# Patient Record
Sex: MALE | Race: WHITE | NOT HISPANIC OR LATINO | Employment: OTHER | URBAN - METROPOLITAN AREA
[De-identification: names, ages, dates, MRNs, and addresses within clinical notes are randomized per-mention and may not be internally consistent; named-entity substitution may affect disease eponyms.]

---

## 2018-04-10 ENCOUNTER — RECORDS - HEALTHEAST (OUTPATIENT)
Dept: LAB | Facility: CLINIC | Age: 68
End: 2018-04-10

## 2018-04-10 LAB
ALBUMIN FLD-MCNC: 0.8 G/DL
GLUCOSE FLD-MCNC: 120 MG/DL
LDH FLD L TO P-CCNC: 71 U/L
PROT FLD-MCNC: 1.7 G/DL

## 2024-09-18 ENCOUNTER — REFERRAL (OUTPATIENT)
Dept: TRANSPLANT | Facility: CLINIC | Age: 74
End: 2024-09-18

## 2024-09-18 DIAGNOSIS — K76.6 PORTAL HYPERTENSION (H): ICD-10-CM

## 2024-09-18 DIAGNOSIS — K70.31 ALCOHOLIC CIRRHOSIS OF LIVER WITH ASCITES (H): ICD-10-CM

## 2024-09-18 DIAGNOSIS — K40.90 HERNIA, INGUINAL: Primary | ICD-10-CM

## 2024-09-18 NOTE — TELEPHONE ENCOUNTER
From [facility] - saw Dr. Balderas up there 9/11/24    Patient is being referred down to see Dr. Dominguez for possible hernia repair.     Low MELD   Cirrhotic patient - s/p TIPS    See CE    September 23, 2024 3:54 PM - Jose Rafael Solis Jr., RN:   Orders placed and message sent to clinic scheduling and coordinators to set up

## 2024-09-23 NOTE — TELEPHONE ENCOUNTER
September 23, 2024 4:27 PM - Jose Rafael Solis Jr. RN: '  Keeping open until scheduling confirmed.

## 2024-10-28 PROBLEM — K70.30 ALCOHOLIC CIRRHOSIS OF LIVER WITHOUT ASCITES (H): Status: ACTIVE | Noted: 2024-10-28

## 2024-10-28 PROBLEM — I26.99 RECURRENT PULMONARY EMBOLI (H): Status: ACTIVE | Noted: 2017-10-30

## 2024-10-28 PROBLEM — J84.9 ILD (INTERSTITIAL LUNG DISEASE) (H): Status: ACTIVE | Noted: 2017-10-11

## 2024-10-28 PROBLEM — Z95.828 S/P TIPS (TRANSJUGULAR INTRAHEPATIC PORTOSYSTEMIC SHUNT): Status: ACTIVE | Noted: 2023-12-22

## 2024-10-28 PROBLEM — K40.90 HERNIA, INGUINAL: Status: ACTIVE | Noted: 2024-10-28

## 2024-10-28 PROBLEM — K70.31 ALCOHOLIC CIRRHOSIS OF LIVER WITH ASCITES (H): Status: ACTIVE | Noted: 2018-04-03

## 2024-10-28 PROBLEM — K82.9 GALL BLADDER DISEASE: Status: ACTIVE | Noted: 2023-06-14

## 2024-10-28 PROBLEM — K76.6 PORTAL HYPERTENSION (H): Status: ACTIVE | Noted: 2018-04-03

## 2024-10-28 PROBLEM — R79.89 ELEVATED TSH: Status: ACTIVE | Noted: 2022-12-09

## 2024-10-28 PROBLEM — K70.31 ALCOHOLIC CIRRHOSIS OF LIVER WITH ASCITES (H): Status: RESOLVED | Noted: 2018-04-03 | Resolved: 2024-10-28

## 2024-11-18 ENCOUNTER — ANESTHESIA EVENT (OUTPATIENT)
Dept: SURGERY | Facility: CLINIC | Age: 74
End: 2024-11-18

## 2024-11-18 ENCOUNTER — OFFICE VISIT (OUTPATIENT)
Dept: TRANSPLANT | Facility: CLINIC | Age: 74
End: 2024-11-18
Attending: TRANSPLANT SURGERY
Payer: COMMERCIAL

## 2024-11-18 ENCOUNTER — APPOINTMENT (OUTPATIENT)
Dept: LAB | Facility: CLINIC | Age: 74
End: 2024-11-18
Payer: COMMERCIAL

## 2024-11-18 ENCOUNTER — OFFICE VISIT (OUTPATIENT)
Dept: SURGERY | Facility: CLINIC | Age: 74
End: 2024-11-18
Attending: TRANSPLANT SURGERY
Payer: COMMERCIAL

## 2024-11-18 VITALS
HEART RATE: 79 BPM | BODY MASS INDEX: 21.82 KG/M2 | OXYGEN SATURATION: 98 % | WEIGHT: 139 LBS | HEIGHT: 67 IN | SYSTOLIC BLOOD PRESSURE: 113 MMHG | DIASTOLIC BLOOD PRESSURE: 76 MMHG | TEMPERATURE: 97.7 F

## 2024-11-18 VITALS
HEART RATE: 91 BPM | DIASTOLIC BLOOD PRESSURE: 64 MMHG | SYSTOLIC BLOOD PRESSURE: 104 MMHG | OXYGEN SATURATION: 94 % | WEIGHT: 139.2 LBS

## 2024-11-18 DIAGNOSIS — R94.5 ABNORMAL RESULTS OF LIVER FUNCTION STUDIES: ICD-10-CM

## 2024-11-18 DIAGNOSIS — K40.90 LEFT INGUINAL HERNIA: ICD-10-CM

## 2024-11-18 DIAGNOSIS — K70.31 ALCOHOLIC CIRRHOSIS OF LIVER WITH ASCITES (H): ICD-10-CM

## 2024-11-18 DIAGNOSIS — K40.90 HERNIA, INGUINAL: ICD-10-CM

## 2024-11-18 DIAGNOSIS — K40.90 LEFT INGUINAL HERNIA: Primary | ICD-10-CM

## 2024-11-18 DIAGNOSIS — K76.6 PORTAL HYPERTENSION (H): ICD-10-CM

## 2024-11-18 LAB
ALBUMIN SERPL BCG-MCNC: 3.3 G/DL (ref 3.5–5.2)
ALP SERPL-CCNC: 106 U/L (ref 40–150)
ALT SERPL W P-5'-P-CCNC: 10 U/L (ref 0–70)
ANION GAP SERPL CALCULATED.3IONS-SCNC: 8 MMOL/L (ref 7–15)
AST SERPL W P-5'-P-CCNC: 24 U/L (ref 0–45)
BASOPHILS # BLD AUTO: 0 10E3/UL (ref 0–0.2)
BASOPHILS NFR BLD AUTO: 0 %
BILIRUB DIRECT SERPL-MCNC: 0.21 MG/DL (ref 0–0.3)
BILIRUB SERPL-MCNC: 0.7 MG/DL
BUN SERPL-MCNC: 21.4 MG/DL (ref 8–23)
CALCIUM SERPL-MCNC: 9.2 MG/DL (ref 8.8–10.4)
CHLORIDE SERPL-SCNC: 106 MMOL/L (ref 98–107)
CREAT SERPL-MCNC: 0.97 MG/DL (ref 0.67–1.17)
EGFRCR SERPLBLD CKD-EPI 2021: 83 ML/MIN/1.73M2
EOSINOPHIL # BLD AUTO: 0.1 10E3/UL (ref 0–0.7)
EOSINOPHIL NFR BLD AUTO: 2 %
ERYTHROCYTE [DISTWIDTH] IN BLOOD BY AUTOMATED COUNT: 15.9 % (ref 10–15)
GLUCOSE SERPL-MCNC: 117 MG/DL (ref 70–99)
HCO3 SERPL-SCNC: 25 MMOL/L (ref 22–29)
HCT VFR BLD AUTO: 34.6 % (ref 40–53)
HGB BLD-MCNC: 10.6 G/DL (ref 13.3–17.7)
IMM GRANULOCYTES # BLD: 0 10E3/UL
IMM GRANULOCYTES NFR BLD: 0 %
INR PPP: 1.65 (ref 0.85–1.15)
LYMPHOCYTES # BLD AUTO: 0.7 10E3/UL (ref 0.8–5.3)
LYMPHOCYTES NFR BLD AUTO: 9 %
MCH RBC QN AUTO: 25.2 PG (ref 26.5–33)
MCHC RBC AUTO-ENTMCNC: 30.6 G/DL (ref 31.5–36.5)
MCV RBC AUTO: 82 FL (ref 78–100)
MONOCYTES # BLD AUTO: 0.6 10E3/UL (ref 0–1.3)
MONOCYTES NFR BLD AUTO: 8 %
NEUTROPHILS # BLD AUTO: 5.8 10E3/UL (ref 1.6–8.3)
NEUTROPHILS NFR BLD AUTO: 81 %
NRBC # BLD AUTO: 0 10E3/UL
NRBC BLD AUTO-RTO: 0 /100
PLATELET # BLD AUTO: 150 10E3/UL (ref 150–450)
POTASSIUM SERPL-SCNC: 4.9 MMOL/L (ref 3.4–5.3)
PROT SERPL-MCNC: 8 G/DL (ref 6.4–8.3)
RBC # BLD AUTO: 4.2 10E6/UL (ref 4.4–5.9)
SODIUM SERPL-SCNC: 139 MMOL/L (ref 135–145)
WBC # BLD AUTO: 7.2 10E3/UL (ref 4–11)

## 2024-11-18 PROCEDURE — 85610 PROTHROMBIN TIME: CPT | Performed by: PATHOLOGY

## 2024-11-18 PROCEDURE — G0463 HOSPITAL OUTPT CLINIC VISIT: HCPCS | Performed by: TRANSPLANT SURGERY

## 2024-11-18 PROCEDURE — 85025 COMPLETE CBC W/AUTO DIFF WBC: CPT | Performed by: PATHOLOGY

## 2024-11-18 PROCEDURE — 80053 COMPREHEN METABOLIC PANEL: CPT | Performed by: PATHOLOGY

## 2024-11-18 PROCEDURE — 99205 OFFICE O/P NEW HI 60 MIN: CPT | Performed by: PHYSICIAN ASSISTANT

## 2024-11-18 PROCEDURE — 99204 OFFICE O/P NEW MOD 45 MIN: CPT | Performed by: TRANSPLANT SURGERY

## 2024-11-18 PROCEDURE — 82248 BILIRUBIN DIRECT: CPT | Performed by: PATHOLOGY

## 2024-11-18 PROCEDURE — 36415 COLL VENOUS BLD VENIPUNCTURE: CPT | Performed by: PATHOLOGY

## 2024-11-18 RX ORDER — ACETAMINOPHEN 500 MG
500 TABLET ORAL
COMMUNITY
Start: 2023-03-31

## 2024-11-18 RX ORDER — WARFARIN SODIUM 1 MG/1
TABLET ORAL EVERY EVENING
COMMUNITY
Start: 2024-11-18

## 2024-11-18 RX ORDER — POTASSIUM CHLORIDE 1500 MG/1
20 TABLET, EXTENDED RELEASE ORAL PRN
COMMUNITY
Start: 2024-08-26

## 2024-11-18 RX ORDER — LACTULOSE 10 G/15ML
15 SOLUTION ORAL DAILY
COMMUNITY
Start: 2024-05-14

## 2024-11-18 RX ORDER — AMILORIDE HYDROCHLORIDE 5 MG/1
10 TABLET ORAL EVERY MORNING
COMMUNITY
Start: 2024-05-14

## 2024-11-18 RX ORDER — FUROSEMIDE 20 MG/1
60 TABLET ORAL EVERY MORNING
COMMUNITY
Start: 2024-05-14

## 2024-11-18 RX ORDER — ALLOPURINOL 300 MG/1
300 TABLET ORAL
COMMUNITY
Start: 2024-05-10

## 2024-11-18 ASSESSMENT — LIFESTYLE VARIABLES: TOBACCO_USE: 0

## 2024-11-18 ASSESSMENT — PAIN SCALES - GENERAL: PAINLEVEL_OUTOF10: NO PAIN (0)

## 2024-11-18 NOTE — H&P
Pre-Operative H & P     CC:  Preoperative exam to assess for increased cardiopulmonary risk while undergoing surgery and anesthesia.    Date of Encounter: 11/18/2024  Primary Care Physician:  No Ref-Primary, Physician     Reason for visit:   Encounter Diagnoses   Name Primary?    Left inguinal hernia     Alcoholic cirrhosis of liver with ascites (H)     Abnormal results of liver function studies        JOSELYN Morel is a 74 year old male who presents for pre-operative H & P in preparation for  Procedure Information       Date/Time: TBD     Procedure: Inguinal hernia repair    Anesthesia type: General    Pre-op diagnosis: Left inguinal hernia    Location: [facility]    Providers: Russ Dominguez MD             Patient is being evaluated for comorbid conditions of interstitial lung disease, recurrent PE on warfarin, alcoholic cirrhosis, portal hypertension s/p TIPS, gout.    He was evaluated earlier today by Dr. Dominguez for a symptomatic left inguinal hernia. The hernia has been present for several years but recently has increased in size and become more painful. He reports that he also has a hernia on the right side, but this is less bothersome. The patient reports that he is anticipating to have surgery before the end of the year.     History is obtained from the patient and chart review. He is accompanied by his wife today.     Hx of abnormal bleeding or anti-platelet use: Warfarin      Past Medical History  Past Medical History:   Diagnosis Date    Alcoholic cirrhosis of liver without ascites (H) 10/28/2024    Gout     Hernia, inguinal 10/28/2024    ILD (interstitial lung disease) (H)     Portal hypertension (H)     Recurrent pulmonary embolism (H)     S/P TIPS (transjugular intrahepatic portosystemic shunt)        Past Surgical History  Past Surgical History:   Procedure Laterality Date    CHOLECYSTECTOMY, OPEN      HERNIA REPAIR      age 7     HERNIA REPAIR      recurrent ventral hernia    TIPS PROCEDURE      x3, last 5/2024       Prior to Admission Medications  Current Outpatient Medications   Medication Sig Dispense Refill    acetaminophen (TYLENOL) 500 MG tablet Take 500 mg by mouth.      allopurinol (ZYLOPRIM) 300 MG tablet Take 300 mg by mouth twice a week.      aMILoride (MIDAMOR) 5 MG tablet Take 10 mg by mouth every morning.      furosemide (LASIX) 20 MG tablet Take 60 mg by mouth every morning.      lactulose (CHRONULAC) 10 GM/15ML solution Take 15 mLs by mouth daily.      potassium chloride lópez ER (KLOR-CON M20) 20 MEQ CR tablet Take 20 mEq by mouth as needed.      rifaximin (XIFAXAN) 550 MG TABS tablet Take 550 mg by mouth 2 times daily.      warfarin ANTICOAGULANT (COUMADIN) 1 MG tablet Take by mouth every evening.         Allergies  Allergies   Allergen Reactions    Food Hives     tangerines    Other Environmental Allergy Other (See Comments) and Itching     pollens       Social History  Social History     Socioeconomic History    Marital status:      Spouse name: Not on file    Number of children: Not on file    Years of education: Not on file    Highest education level: Not on file   Occupational History    Not on file   Tobacco Use    Smoking status: Never     Passive exposure: Never    Smokeless tobacco: Never   Substance and Sexual Activity    Alcohol use: Not Currently     Comment: Sober March 2023    Drug use: Never    Sexual activity: Not on file   Other Topics Concern    Not on file   Social History Narrative    Not on file     Social Drivers of Health     Financial Resource Strain: Low Risk  (4/28/2023)    Received from [facility]    Overall Financial Resource Strain (CARDIA)     Difficulty of Paying Living Expenses: Not hard at all   Food Insecurity: No Food Insecurity (9/29/2024)    Received from [facility]    Hunger Vital Sign     Worried About  Running Out of Food in the Last Year: Never true     Ran Out of Food in the Last Year: Never true   Transportation Needs: No Transportation Needs (9/29/2024)    [facility]    PRAPARE - Transportation     Lack of Transportation (Medical): No     Lack of Transportation (Non-Medical): No   Physical Activity: Inactive (4/29/2024)    Received from [facility]    Exercise Vital Sign     Days of Exercise per Week: 0 days     Minutes of Exercise per Session: 0 min   Stress: No Stress Concern Present (4/29/2024)    Received from [facility]    [employer] - Occupational Stress Questionnaire     Feeling of Stress : Not at all   Social Connections: Moderately Isolated (4/29/2024)    Received from [facility]    Social Connection and Isolation Panel [NHANES]     Frequency of Communication with Friends and Family: Twice a week     Frequency of Social Gatherings with Friends and Family: Once a week     Attends Sabianist Services: Never     Active Member of Clubs or Organizations: No     Attends Club or Organization Meetings: Never     Marital Status:    Interpersonal Safety: Not At Risk (10/25/2024)    Received from [facility] IP Custom IPV     Do you feel UNSAFE in any of your personal relationships with your family members or any other acquaintances?: No   Housing Stability: Low Risk  (9/29/2024)    [facility]    Housing Stability Vital Sign     Unable to Pay for Housing in the Last Year: No     Number of Times Moved in the Last Year: 0     Homeless in the Last Year: No       Family History  Family History   Problem Relation Age of Onset    Anesthesia Reaction No family hx of     Venous thrombosis No family hx of        Review of Systems  The  complete review of systems is negative other than noted in the HPI or here.   Anesthesia Evaluation   Pt has had prior anesthetic.     No history of anesthetic complications       ROS/MED HX  ENT/Pulmonary: Comment: Interstitial lung disease   (-) tobacco use, asthma and ONEYDA risk factors   Neurologic:  - neg neurologic ROS     Cardiovascular:     (+)  - -   -  - -                           valvular problems/murmurs  mild mitral and tricuspid regurgitation.    Previous cardiac testing   Echo: Date: 10/19/23 Results:  Interpretation Summary   Normal LV size and function, EF 60%   Normal RV size and function   Mild left atrial dilatation.   Valves: Aortic valve not well seen, grossly normal function, and mild mitral and trace tricuspid regurgitation.   Normal IVC size and right atrial pressure. Unable to assess RV systolic pressure.     No significant change from prior study.     Stress Test:  Date: Results:    ECG Reviewed:  Date: 11/18/24 Results:  Sinus rhythm   Low voltage QRS     Cath:  Date: Results:      METS/Exercise Tolerance: >4 METS Comment: Activity is limited due to hernias. Able to walk >2 blocks and up 2 flights of stairs without exertional symptoms.    Hematologic:     (+) History of blood clots (recurrent PE),    pt is anticoagulated, anemia,       (-) history of blood transfusion   Musculoskeletal: Comment: Gout    Low back pain      GI/Hepatic: Comment: S/p lap converted to open cholecystectomy 10/1/24    alcoholic cirrhosis, portal hypertension, s/p TIPS and TIPS revision 5/24/24    History of ventral hernia repair    (+)             liver disease,    (-) GERD   Renal/Genitourinary:  - neg Renal ROS     Endo:  - neg endo ROS     Psychiatric/Substance Use:     (+)   alcohol abuse (sober since may 2023)      Infectious Disease:  - neg infectious disease ROS     Malignancy:  - neg malignancy ROS     Other:            /76 (BP Location: Right arm, Patient Position: Sitting, Cuff Size: Adult  "Regular)   Pulse 79   Temp 97.7  F (36.5  C) (Oral)   Ht 1.702 m (5' 7\")   Wt 63 kg (139 lb)   SpO2 98%   BMI 21.77 kg/m      Physical Exam   Constitutional: Pleasant, well-appearing, no apparent distress, and appears stated age.  Eyes: Pupils equal, round and reactive to light, extra ocular muscles intact, sclera clear, conjunctiva normal.  HENT: Normocephalic and atraumatic, oral pharynx with moist mucus membranes, poor dentition. No goiter appreciated.   Respiratory: Clear to auscultation bilaterally, no crackles or wheezing.  Cardiovascular: Regular rate and rhythm, normal S1 and S2, + murmur.  Carotids +2, no bruits. No edema. Palpable pulses to radial  DP and PT arteries.   GI: Non-distended abdomen  Lymph/Hematologic: No cervical lymphadenopathy and no supraclavicular lymphadenopathy.  Genitourinary:  Deferred  Skin: Warm and dry.  No rashes on exposed skin   Musculoskeletal: Full ROM of neck. There is no redness, warmth, or swelling of the visible joints. Gross motor strength is normal.    Neurologic: Awake, alert, oriented to name, place and time. Cranial nerves II-XII are grossly intact. Gait is normal.   Neuropsychiatric: Calm, cooperative. Normal affect.       Prior Labs/Diagnostic Studies   All labs and imaging personally reviewed     EKG/ stress test - if available please see in ROS above   No results found.    CT Angio Chest 9/28/24  IMPRESSION:   1.  No evidence of pulmonary embolism within the main or lobar pulmonary arteries.  Motion limits evaluation of the distal pulmonary arteries.   2.  No acute intrathoracic findings.  No evidence of pneumonia.   3.  Mild basilar pulmonary fibrosis, right-greater-than-left.   4.  Coronary artery atherosclerosis.   5.  Small hiatal hernia.       The patient's records and results personally reviewed by this provider.     Outside records reviewed from: Care Everywhere    LAB/DIAGNOSTIC STUDIES TODAY:  Per transplant team    Assessment  Les Morel is a " 74 year old male seen as a PAC referral for risk assessment and optimization for anesthesia.    Plan/Recommendations  Pt will be optimized for the proposed procedure.  See below for details on the assessment, risk, and preoperative recommendations    NEUROLOGY  - No history of TIA, CVA or seizure    -Post Op delirium risk factors:  Age    HEENT  - No current airway concerns.  Will need to be reassessed day of surgery.  Mallampati: I  TM: > 3  Poor dentition. Patient reports he has an upcoming visit with his dentist.     CARDIAC  - No history of CAD, Hypertension, and Afib  - Heart murmur  Faint murmur appreciated on exam today and patient reports he has been told of this in the past. Echo completed ~1 year ago with mild mitral and tricuspid regurgitation. Patient is asymptomatic at this time.  - Patient denies any other cardiac history or cardiac symptoms.    - METS (Metabolic Equivalents)  Patient performs 4 or more METS exercise without symptoms             Total Score: 0      RCRI-Very low risk: Class 1 0.4% complication rate             Total Score: 0        PULMONARY    ONEYDA Low Risk             Total Score: 2    ONEYDA: Over 50 ys old    ONEYDA: Male      - Denies asthma or inhaler use  - Interstitial lung disease  Mild interstitial lung disease noted on chart review. Stable on CT imaging in 2017 and 2024.  Lungs CTA on exam today. O2 saturations 98% on room air.  - Tobacco History    History   Smoking Status    Never   Smokeless Tobacco    Never       GI  - Alcoholic cirrhosis s/p TIPS x 3  Last TIPS 5/2024  Last paracentesis 2/2024  No alcohol use since May 2023  LFTs today within normal limits  Hold diuretics day of surgery  - History of ventral hernia repair  - Bilateral inguinal hernias (L>R)  Planning for future hernia repair as above    PONV Medium Risk  Total Score: 2           1 AN PONV: Patient is not a current smoker    1 AN PONV: Intended Post Op Opioids        /RENAL  - Baseline Creatinine   "0.97    ENDOCRINE    - BMI: Estimated body mass index is 21.77 kg/m  as calculated from the following:    Height as of this encounter: 1.702 m (5' 7\").    Weight as of this encounter: 63 kg (139 lb).  Healthy Weight (BMI 18.5-24.9)  - No history of Diabetes Mellitus    HEME  VTE Medium Risk 1.8%             Total Score: 6    VTE: Greater than 59 yrs old    VTE: Male    VTE: Pt history of VTE      - Coagulopathy second to Warfarin (Coumadin, Jantoven)  Patient will work with anticoagulation clinic for hold/bridging plan once surgery has been scheduled  - Chronic anemia  Baseline Hgb: 9-10.6  Recommend perioperative use of blood conservation techniques intraoperatively and close monitoring for postoperative bleeding.      MSK  Patient is NOT Frail             Total Score: 2    Frailty: Weight loss 10 lbs or greater    Frailty: Overall lack of energy      - Low back pain  - History of gout    Different anesthesia methods/types have been discussed with the patient, but they are aware that the final plan will be decided by the assigned anesthesia provider on the date of service.    The patient is optimized for their procedure. AVS with information on surgery time/arrival time, meds and NPO status given by nursing staff. No further diagnostic testing indicated.      On the day of service:     Prep time: 25 minutes  Visit time: 30 minutes  Documentation time: 10 minutes  ------------------------------------------  Total time: 65 minutes      Gita Ojeda PA-C  Preoperative Assessment Center  [facility]  Clinic and Surgery Center  Phone: [phone]  Fax: [phone]    "

## 2024-11-18 NOTE — PATIENT INSTRUCTIONS
Preparing for Your Surgery      Name:  Les Morel   MRN:  [id number]   :  1950   Today's Date:  2024       Arriving for surgery:  Surgery date:  24 (Tentative date)  Arrival time:  To be determined (TBD)    Please come to: (subject to change)     Please come to:       [facility]    [address] [place]  [address]     The [facility] [place] Patient/Visitor Ramp is at [address]. Patients and visitors who self-park will receive the reduced hospital parking rate. If the Patient /Visitor Ramp is full, please follow the signs to the [employer] car park located at the main hospital entrance.       parking is available (24 hours/ 7 days a week)      Discounted parking pass options are available for patients and visitors. They can be purchased at the [employer] desk at the main hospital entrance.     -    Stop at the security desk and they will direct surgery patients to the [facility] [phone]        - If you need directions, a wheelchair or an escort please stop at the Information/security desk in the lobby.     What can I eat or drink?  -  You may eat and drink normally up to 8 hours prior to arrival time. (Until TBD)  -  You may have clear liquids until 2 hours prior to arrival time. (Until TBD)    Examples of clear liquids:  Water  Clear broth  Juices (apple, white grape, white cranberry  and cider) without pulp  Noncarbonated, powder based beverages  (lemonade and Yves-Aid)  Sodas (Sprite, 7-Up, ginger ale and seltzer)  Coffee or tea (without milk or cream)  Gatorade    -  No Alcohol or cannabis products for at least 24 hours before surgery.     Which medicines can I take?    Hold Aspirin for 7 days before surgery.   Hold Multivitamins for 7 days before surgery.  Hold Supplements for 7 days before surgery.  Hold Ibuprofen (Advil, Motrin) for  1 day(s) before surgery--unless otherwise directed by surgeon.  Hold Naproxen (Aleve) for 4 days before surgery.    In regards to Warfarin (Coumadin), we defer to your anticoagulation clinic for final recommendations.    -  DO NOT take these medications the day of surgery:  Furosemide (Lasix)  Lactulose  Potassium       -  PLEASE TAKE these medications the day of surgery:  Tylenol as needed  Allopurinol (Zyloprim)  Rifaximin (Xifaxan)    How do I prepare myself?  - Please take 2 showers (one the night prior to surgery and one the morning of surgery) using Scrubcare or Hibiclens soap.    Use this soap only from the neck to your toes. Avoid genital area      Leave the soap on your skin for one minute--then rinse thoroughly.      You may use your own shampoo and conditioner. No other hair products.   - Please remove all jewelry and body piercings.  - No lotions, deodorants or fragrance.  - No makeup or fingernail polish.   - Bring your ID and insurance card.    -If you use a CPAP machine, please bring the CPAP machine, tubing, and mask to hospital.    -If you have a Deep Brain Stimulator, Spinal Cord Stimulator, or any Neuro Stimulator device---you must bring the remote control to the hospital.      ALL PATIENTS GOING HOME THE SAME DAY OF SURGERY ARE REQUIRED TO HAVE A RESPONSIBLE ADULT TO DRIVE AND BE IN ATTENDANCE WITH THEM FOR 24 HOURS FOLLOWING SURGERY.    Covid testing policy as of 12/06/2022  Your surgeon will notify and schedule you for a COVID test if one is needed before surgery--please direct any questions or COVID symptoms to your surgeon      Questions or Concerns:    - For any questions regarding the day of surgery or your hospital stay, please contact the Pre Admission Nursing Office at [phone].       - If you have health changes between today and your surgery, please call your surgeon.       - For questions after surgery, please call your surgeons office.           Current Visitor Guidelines    You  may have 2 visitors in the pre op area.    Visiting hours: 8 a.m. to 8:30 p.m.    Patients confirmed or suspected to have symptoms of COVID 19 or flu:     No visitors allowed for adult patients.   Children (under age 18) can have 1 named visitor.     People who are sick or showing symptoms of COVID 19 or flu:    Are not allowed to visit patients--we can only make exceptions in special situations.       Please follow these guidelines for your visit:          Please maintain social distance          Masking is optional--however at times you may be asked to wear a mask for the safety of yourself and others     Clean your hands with alcohol hand . Do this when you arrive at and leave the building and patient room,    And again after you touch your mask or anything in the room.     Go directly to and from the room you are visiting.     Stay in the patient s room during your visit. Limit going to other places in the hospital as much as possible     Leave bags and jackets at home or in the car.     For everyone s health, please don t come and go during your visit. That includes for smoking   during your visit.

## 2024-11-18 NOTE — LETTER
11/18/2024      Les Morel  [address] [address] [facility] [address]      Dear Colleague,    Thank you for referring your patient, Les Morel, to the [facility]. Please see a copy of my visit note below.    Patient referred by Dr. Fracisco Rodriguez MD    Patient has been diagnosed with Laënnec cirrhosis.  He has decompensated in the form of ascites and he has received TIPS followed by elevation.  He recently had a cholecystectomy done which was a prolonged hospital stay.  He is now presenting with a symptomatic left inguinal hernia.  There is history of pain as well as reducibility.  He is also on anticoagulation     Past Medical History:   Diagnosis Date     Alcoholic cirrhosis of liver without ascites (H) 10/28/2024     Gout      Hernia, inguinal 10/28/2024     ILD (interstitial lung disease) (H)      Portal hypertension (H)      Recurrent pulmonary embolism (H)      S/P TIPS (transjugular intrahepatic portosystemic shunt)      Past Surgical History:   Procedure Laterality Date     CHOLECYSTECTOMY, OPEN       HERNIA REPAIR      age 7     HERNIA REPAIR      recurrent ventral hernia     TIPS PROCEDURE      x3, last 5/2024     Past Surgical History:   Procedure Laterality Date     CHOLECYSTECTOMY, OPEN       HERNIA REPAIR      age 7     HERNIA REPAIR      recurrent ventral hernia     TIPS PROCEDURE      x3, last 5/2024       Patient Active Problem List   Diagnosis     Cardiac dysrhythmia     Elevated TSH     Gall bladder disease     ILD (interstitial lung disease) (H)     Portal hypertension (H)     Recurrent pulmonary emboli (H)     S/P TIPS (transjugular intrahepatic portosystemic shunt)     Hernia, inguinal     Alcoholic cirrhosis of liver without ascites (H)       Current Outpatient Medications   Medication Sig Dispense Refill     acetaminophen (TYLENOL) 500 MG tablet Take 500 mg by mouth.       allopurinol (ZYLOPRIM) 300 MG tablet Take 300 mg by mouth twice a week.       furosemide (LASIX)  20 MG tablet Take 60 mg by mouth every morning.       lactulose (CHRONULAC) 10 GM/15ML solution Take 15 mLs by mouth daily.       potassium chloride lópez ER (KLOR-CON M20) 20 MEQ CR tablet Take 20 mEq by mouth as needed.       rifaximin (XIFAXAN) 550 MG TABS tablet Take 550 mg by mouth 2 times daily.       warfarin ANTICOAGULANT (COUMADIN) 1 MG tablet Take by mouth every evening.       aMILoride (MIDAMOR) 5 MG tablet Take 10 mg by mouth every morning.            Allergies   Allergen Reactions     Food Hives     tangerines     Other Environmental Allergy Other (See Comments) and Itching     pollens       Vitals:    11/18/24 0933   BP: 104/64   Pulse: 91   SpO2: 94%   Weight: 63.1 kg (139 lb 3.2 oz)     Examination of the left groin there is a reducible inguinal hernia indirect.  There is also a previous hernia scar site on the right side.    Clinical impression:    Left indirect inguinal hernia  Laënnec cirrhosis  MELD 3.0: 11 at 11/18/2024 10:31 AM  MELD-Na: 12 at 11/18/2024 10:31 AM  Calculated from:  Serum Creatinine: 0.97 mg/dL (Using min of 1 mg/dL) at 11/18/2024 10:31 AM  Serum Sodium: 139 mmol/L (Using max of 137 mmol/L) at 11/18/2024 10:31 AM  Total Bilirubin: 0.7 mg/dL (Using min of 1 mg/dL) at 11/18/2024 10:31 AM  Serum Albumin: 3.3 g/dL at 11/18/2024 10:31 AM  INR(ratio): 1.65 at 11/18/2024 10:31 AM  Age at listing (hypothetical): 74 years  Sex: Male at 11/18/2024 10:31 AM    Recommendations:  Mesh repair of left inguinal hernia  I explained the risk benefits alternatives of mesh repair of the left inguinal hernia which included but were not limited to infection recurrence and decompensation of the liver disease the patient understands the risks and would like to proceed to surgery.  Stop anticoagulation 48 hours before surgery.      Again, thank you for allowing me to participate in the care of your patient.        Sincerely,        Russ Dominguez MD

## 2024-11-20 LAB
ATRIAL RATE - MUSE: 78 BPM
DIASTOLIC BLOOD PRESSURE - MUSE: NORMAL MMHG
INTERPRETATION ECG - MUSE: NORMAL
P AXIS - MUSE: 27 DEGREES
PR INTERVAL - MUSE: 204 MS
QRS DURATION - MUSE: 84 MS
QT - MUSE: 364 MS
QTC - MUSE: 414 MS
R AXIS - MUSE: -29 DEGREES
SYSTOLIC BLOOD PRESSURE - MUSE: NORMAL MMHG
T AXIS - MUSE: 24 DEGREES
VENTRICULAR RATE- MUSE: 78 BPM

## 2024-11-25 ENCOUNTER — PREP FOR PROCEDURE (OUTPATIENT)
Dept: TRANSPLANT | Facility: CLINIC | Age: 74
End: 2024-11-25
Payer: COMMERCIAL

## 2024-11-25 DIAGNOSIS — K40.90 HERNIA, INGUINAL: Primary | ICD-10-CM

## 2024-11-25 NOTE — PROGRESS NOTES
Patient referred by Dr. Fracisco Rodriguez MD    Patient has been diagnosed with Laënnec cirrhosis.  He has decompensated in the form of ascites and he has received TIPS followed by elevation.  He recently had a cholecystectomy done which was a prolonged hospital stay.  He is now presenting with a symptomatic left inguinal hernia.  There is history of pain as well as reducibility.  He is also on anticoagulation     Past Medical History:   Diagnosis Date    Alcoholic cirrhosis of liver without ascites (H) 10/28/2024    Gout     Hernia, inguinal 10/28/2024    ILD (interstitial lung disease) (H)     Portal hypertension (H)     Recurrent pulmonary embolism (H)     S/P TIPS (transjugular intrahepatic portosystemic shunt)      Past Surgical History:   Procedure Laterality Date    CHOLECYSTECTOMY, OPEN      HERNIA REPAIR      age 7    HERNIA REPAIR      recurrent ventral hernia    TIPS PROCEDURE      x3, last 5/2024     Past Surgical History:   Procedure Laterality Date    CHOLECYSTECTOMY, OPEN      HERNIA REPAIR      age 7    HERNIA REPAIR      recurrent ventral hernia    TIPS PROCEDURE      x3, last 5/2024       Patient Active Problem List   Diagnosis    Cardiac dysrhythmia    Elevated TSH    Gall bladder disease    ILD (interstitial lung disease) (H)    Portal hypertension (H)    Recurrent pulmonary emboli (H)    S/P TIPS (transjugular intrahepatic portosystemic shunt)    Hernia, inguinal    Alcoholic cirrhosis of liver without ascites (H)       Current Outpatient Medications   Medication Sig Dispense Refill    acetaminophen (TYLENOL) 500 MG tablet Take 500 mg by mouth.      allopurinol (ZYLOPRIM) 300 MG tablet Take 300 mg by mouth twice a week.      furosemide (LASIX) 20 MG tablet Take 60 mg by mouth every morning.      lactulose (CHRONULAC) 10 GM/15ML solution Take 15 mLs by mouth daily.      potassium chloride lópez ER (KLOR-CON M20) 20 MEQ CR tablet Take 20 mEq by mouth as needed.      rifaximin (XIFAXAN) 550 MG TABS  tablet Take 550 mg by mouth 2 times daily.      warfarin ANTICOAGULANT (COUMADIN) 1 MG tablet Take by mouth every evening.      aMILoride (MIDAMOR) 5 MG tablet Take 10 mg by mouth every morning.            Allergies   Allergen Reactions    Food Hives     tangerines    Other Environmental Allergy Other (See Comments) and Itching     pollens       Vitals:    11/18/24 0933   BP: 104/64   Pulse: 91   SpO2: 94%   Weight: 63.1 kg (139 lb 3.2 oz)     Examination of the left groin there is a reducible inguinal hernia indirect.  There is also a previous hernia scar site on the right side.    Clinical impression:    Left indirect inguinal hernia  Laënnec cirrhosis  MELD 3.0: 11 at 11/18/2024 10:31 AM  MELD-Na: 12 at 11/18/2024 10:31 AM  Calculated from:  Serum Creatinine: 0.97 mg/dL (Using min of 1 mg/dL) at 11/18/2024 10:31 AM  Serum Sodium: 139 mmol/L (Using max of 137 mmol/L) at 11/18/2024 10:31 AM  Total Bilirubin: 0.7 mg/dL (Using min of 1 mg/dL) at 11/18/2024 10:31 AM  Serum Albumin: 3.3 g/dL at 11/18/2024 10:31 AM  INR(ratio): 1.65 at 11/18/2024 10:31 AM  Age at listing (hypothetical): 74 years  Sex: Male at 11/18/2024 10:31 AM    Recommendations:  Mesh repair of left inguinal hernia  I explained the risk benefits alternatives of mesh repair of the left inguinal hernia which included but were not limited to infection recurrence and decompensation of the liver disease the patient understands the risks and would like to proceed to surgery.  Stop anticoagulation 48 hours before surgery.

## 2024-12-09 ENCOUNTER — TELEPHONE (OUTPATIENT)
Dept: SURGERY | Facility: CLINIC | Age: 74
End: 2024-12-09
Payer: COMMERCIAL

## 2024-12-09 NOTE — TELEPHONE ENCOUNTER
Per the anticoagulation nurse at [facility]:  hold coumadin 5 days prior to surgery, bridge with lovenox, 60 mg BID.  Updated Gita Ojeda NP.  Alena Owens RN

## 2024-12-18 ENCOUNTER — ANESTHESIA EVENT (OUTPATIENT)
Dept: SURGERY | Facility: CLINIC | Age: 74
End: 2024-12-18
Payer: COMMERCIAL

## 2024-12-18 RX ORDER — ENOXAPARIN SODIUM 100 MG/ML
60 INJECTION SUBCUTANEOUS 2 TIMES DAILY
Status: ON HOLD | COMMUNITY
End: 2024-12-24

## 2024-12-19 ENCOUNTER — HOSPITAL ENCOUNTER (INPATIENT)
Facility: CLINIC | Age: 74
End: 2024-12-19
Attending: TRANSPLANT SURGERY | Admitting: TRANSPLANT SURGERY
Payer: COMMERCIAL

## 2024-12-19 ENCOUNTER — APPOINTMENT (OUTPATIENT)
Dept: GENERAL RADIOLOGY | Facility: CLINIC | Age: 74
End: 2024-12-19
Attending: STUDENT IN AN ORGANIZED HEALTH CARE EDUCATION/TRAINING PROGRAM
Payer: COMMERCIAL

## 2024-12-19 ENCOUNTER — ANESTHESIA (OUTPATIENT)
Dept: SURGERY | Facility: CLINIC | Age: 74
End: 2024-12-19
Payer: COMMERCIAL

## 2024-12-19 VITALS
TEMPERATURE: 97.7 F | DIASTOLIC BLOOD PRESSURE: 57 MMHG | SYSTOLIC BLOOD PRESSURE: 115 MMHG | BODY MASS INDEX: 20.8 KG/M2 | HEIGHT: 67 IN | HEART RATE: 75 BPM | RESPIRATION RATE: 17 BRPM | WEIGHT: 132.5 LBS | OXYGEN SATURATION: 96 %

## 2024-12-19 DIAGNOSIS — Z87.19 S/P LEFT INGUINAL HERNIA REPAIR: ICD-10-CM

## 2024-12-19 DIAGNOSIS — K70.30 ALCOHOLIC CIRRHOSIS OF LIVER WITHOUT ASCITES (H): Primary | ICD-10-CM

## 2024-12-19 DIAGNOSIS — I26.99 RECURRENT PULMONARY EMBOLI (H): ICD-10-CM

## 2024-12-19 DIAGNOSIS — Z98.890 S/P LEFT INGUINAL HERNIA REPAIR: ICD-10-CM

## 2024-12-19 LAB
ALBUMIN SERPL BCG-MCNC: 3.3 G/DL (ref 3.5–5.2)
ALP SERPL-CCNC: 82 U/L (ref 40–150)
ALT SERPL W P-5'-P-CCNC: <5 U/L (ref 0–70)
ANION GAP SERPL CALCULATED.3IONS-SCNC: 12 MMOL/L (ref 7–15)
ANION GAP SERPL CALCULATED.3IONS-SCNC: 12 MMOL/L (ref 7–15)
AST SERPL W P-5'-P-CCNC: 26 U/L (ref 0–45)
BACTERIA SPEC CULT: NORMAL
BILIRUB SERPL-MCNC: 1.1 MG/DL
BUN SERPL-MCNC: 16.6 MG/DL (ref 8–23)
BUN SERPL-MCNC: 16.6 MG/DL (ref 8–23)
CALCIUM SERPL-MCNC: 8.3 MG/DL (ref 8.8–10.4)
CALCIUM SERPL-MCNC: 8.3 MG/DL (ref 8.8–10.4)
CHLORIDE SERPL-SCNC: 102 MMOL/L (ref 98–107)
CHLORIDE SERPL-SCNC: 102 MMOL/L (ref 98–107)
CREAT SERPL-MCNC: 0.94 MG/DL (ref 0.67–1.17)
CREAT SERPL-MCNC: 0.94 MG/DL (ref 0.67–1.17)
EGFRCR SERPLBLD CKD-EPI 2021: 86 ML/MIN/1.73M2
EGFRCR SERPLBLD CKD-EPI 2021: 86 ML/MIN/1.73M2
ERYTHROCYTE [DISTWIDTH] IN BLOOD BY AUTOMATED COUNT: 16.2 % (ref 10–15)
GLUCOSE SERPL-MCNC: 161 MG/DL (ref 70–99)
GLUCOSE SERPL-MCNC: 161 MG/DL (ref 70–99)
GRAM STAIN RESULT: NORMAL
GRAM STAIN RESULT: NORMAL
HCO3 SERPL-SCNC: 21 MMOL/L (ref 22–29)
HCO3 SERPL-SCNC: 21 MMOL/L (ref 22–29)
HCT VFR BLD AUTO: 31.8 % (ref 40–53)
HGB BLD-MCNC: 9.9 G/DL (ref 13.3–17.7)
INR PPP: 1.28 (ref 0.85–1.15)
MAGNESIUM SERPL-MCNC: 2.1 MG/DL (ref 1.7–2.3)
MCH RBC QN AUTO: 24.6 PG (ref 26.5–33)
MCHC RBC AUTO-ENTMCNC: 31.1 G/DL (ref 31.5–36.5)
MCV RBC AUTO: 79 FL (ref 78–100)
PHOSPHATE SERPL-MCNC: 3.7 MG/DL (ref 2.5–4.5)
PLATELET # BLD AUTO: 157 10E3/UL (ref 150–450)
POTASSIUM SERPL-SCNC: 4.4 MMOL/L (ref 3.4–5.3)
POTASSIUM SERPL-SCNC: 4.4 MMOL/L (ref 3.4–5.3)
PROT SERPL-MCNC: 7.4 G/DL (ref 6.4–8.3)
RBC # BLD AUTO: 4.03 10E6/UL (ref 4.4–5.9)
SODIUM SERPL-SCNC: 135 MMOL/L (ref 135–145)
SODIUM SERPL-SCNC: 135 MMOL/L (ref 135–145)
WBC # BLD AUTO: 9.4 10E3/UL (ref 4–11)

## 2024-12-19 PROCEDURE — 87102 FUNGUS ISOLATION CULTURE: CPT | Performed by: TRANSPLANT SURGERY

## 2024-12-19 PROCEDURE — 360N000075 HC SURGERY LEVEL 2, PER MIN: Performed by: TRANSPLANT SURGERY

## 2024-12-19 PROCEDURE — 250N000011 HC RX IP 250 OP 636: Performed by: ANESTHESIOLOGY

## 2024-12-19 PROCEDURE — 250N000009 HC RX 250: Performed by: REGISTERED NURSE

## 2024-12-19 PROCEDURE — 250N000013 HC RX MED GY IP 250 OP 250 PS 637: Performed by: STUDENT IN AN ORGANIZED HEALTH CARE EDUCATION/TRAINING PROGRAM

## 2024-12-19 PROCEDURE — P9045 ALBUMIN (HUMAN), 5%, 250 ML: HCPCS | Performed by: REGISTERED NURSE

## 2024-12-19 PROCEDURE — 258N000003 HC RX IP 258 OP 636: Performed by: STUDENT IN AN ORGANIZED HEALTH CARE EDUCATION/TRAINING PROGRAM

## 2024-12-19 PROCEDURE — 250N000011 HC RX IP 250 OP 636: Performed by: TRANSPLANT SURGERY

## 2024-12-19 PROCEDURE — 82310 ASSAY OF CALCIUM: CPT | Performed by: STUDENT IN AN ORGANIZED HEALTH CARE EDUCATION/TRAINING PROGRAM

## 2024-12-19 PROCEDURE — 250N000009 HC RX 250

## 2024-12-19 PROCEDURE — 84132 ASSAY OF SERUM POTASSIUM: CPT | Performed by: NURSE PRACTITIONER

## 2024-12-19 PROCEDURE — 258N000003 HC RX IP 258 OP 636: Performed by: REGISTERED NURSE

## 2024-12-19 PROCEDURE — 84100 ASSAY OF PHOSPHORUS: CPT | Performed by: NURSE PRACTITIONER

## 2024-12-19 PROCEDURE — C9290 INJ, BUPIVACAINE LIPOSOME: HCPCS | Performed by: ANESTHESIOLOGY

## 2024-12-19 PROCEDURE — 3E0436Z INTRODUCTION OF NUTRITIONAL SUBSTANCE INTO CENTRAL VEIN, PERCUTANEOUS APPROACH: ICD-10-PCS | Performed by: TRANSPLANT SURGERY

## 2024-12-19 PROCEDURE — 370N000017 HC ANESTHESIA TECHNICAL FEE, PER MIN: Performed by: TRANSPLANT SURGERY

## 2024-12-19 PROCEDURE — 999N000065 XR ABDOMEN PORT 1 VIEW

## 2024-12-19 PROCEDURE — 0D9N0ZX DRAINAGE OF SIGMOID COLON, OPEN APPROACH, DIAGNOSTIC: ICD-10-PCS | Performed by: TRANSPLANT SURGERY

## 2024-12-19 PROCEDURE — 87205 SMEAR GRAM STAIN: CPT | Performed by: TRANSPLANT SURGERY

## 2024-12-19 PROCEDURE — 999N000141 HC STATISTIC PRE-PROCEDURE NURSING ASSESSMENT: Performed by: TRANSPLANT SURGERY

## 2024-12-19 PROCEDURE — 250N000009 HC RX 250: Performed by: TRANSPLANT SURGERY

## 2024-12-19 PROCEDURE — 85610 PROTHROMBIN TIME: CPT | Performed by: NURSE PRACTITIONER

## 2024-12-19 PROCEDURE — 87076 CULTURE ANAEROBE IDENT EACH: CPT | Performed by: TRANSPLANT SURGERY

## 2024-12-19 PROCEDURE — 710N000010 HC RECOVERY PHASE 1, LEVEL 2, PER MIN: Performed by: TRANSPLANT SURGERY

## 2024-12-19 PROCEDURE — 272N000001 HC OR GENERAL SUPPLY STERILE: Performed by: TRANSPLANT SURGERY

## 2024-12-19 PROCEDURE — 0DQN0ZZ REPAIR SIGMOID COLON, OPEN APPROACH: ICD-10-PCS | Performed by: TRANSPLANT SURGERY

## 2024-12-19 PROCEDURE — 88302 TISSUE EXAM BY PATHOLOGIST: CPT | Mod: TC | Performed by: TRANSPLANT SURGERY

## 2024-12-19 PROCEDURE — 87070 CULTURE OTHR SPECIMN AEROBIC: CPT | Performed by: TRANSPLANT SURGERY

## 2024-12-19 PROCEDURE — 36415 COLL VENOUS BLD VENIPUNCTURE: CPT | Performed by: NURSE PRACTITIONER

## 2024-12-19 PROCEDURE — 250N000025 HC SEVOFLURANE, PER MIN: Performed by: TRANSPLANT SURGERY

## 2024-12-19 PROCEDURE — 250N000011 HC RX IP 250 OP 636: Performed by: REGISTERED NURSE

## 2024-12-19 PROCEDURE — 250N000011 HC RX IP 250 OP 636: Performed by: STUDENT IN AN ORGANIZED HEALTH CARE EDUCATION/TRAINING PROGRAM

## 2024-12-19 PROCEDURE — 85027 COMPLETE CBC AUTOMATED: CPT | Performed by: STUDENT IN AN ORGANIZED HEALTH CARE EDUCATION/TRAINING PROGRAM

## 2024-12-19 PROCEDURE — 88302 TISSUE EXAM BY PATHOLOGIST: CPT | Mod: 26 | Performed by: PATHOLOGY

## 2024-12-19 PROCEDURE — 74018 RADEX ABDOMEN 1 VIEW: CPT | Mod: 26 | Performed by: RADIOLOGY

## 2024-12-19 PROCEDURE — 49521 REREPAIR ING HERNIA BLOCKED: CPT | Mod: LT | Performed by: TRANSPLANT SURGERY

## 2024-12-19 PROCEDURE — 120N000011 HC R&B TRANSPLANT UMMC

## 2024-12-19 PROCEDURE — 83735 ASSAY OF MAGNESIUM: CPT | Performed by: NURSE PRACTITIONER

## 2024-12-19 PROCEDURE — 0YQ60ZZ REPAIR LEFT INGUINAL REGION, OPEN APPROACH: ICD-10-PCS | Performed by: TRANSPLANT SURGERY

## 2024-12-19 PROCEDURE — B4185 PARENTERAL SOL 10 GM LIPIDS: HCPCS | Performed by: TRANSPLANT SURGERY

## 2024-12-19 RX ORDER — NALOXONE HYDROCHLORIDE 0.4 MG/ML
0.4 INJECTION, SOLUTION INTRAMUSCULAR; INTRAVENOUS; SUBCUTANEOUS
Status: DISCONTINUED | OUTPATIENT
Start: 2024-12-19 | End: 2024-12-24 | Stop reason: HOSPADM

## 2024-12-19 RX ORDER — ONDANSETRON 2 MG/ML
4 INJECTION INTRAMUSCULAR; INTRAVENOUS EVERY 30 MIN PRN
Status: DISCONTINUED | OUTPATIENT
Start: 2024-12-19 | End: 2024-12-19 | Stop reason: HOSPADM

## 2024-12-19 RX ORDER — ONDANSETRON 2 MG/ML
4 INJECTION INTRAMUSCULAR; INTRAVENOUS EVERY 6 HOURS PRN
Status: DISCONTINUED | OUTPATIENT
Start: 2024-12-19 | End: 2024-12-24 | Stop reason: HOSPADM

## 2024-12-19 RX ORDER — AMOXICILLIN 250 MG
1 CAPSULE ORAL 2 TIMES DAILY
Status: DISCONTINUED | OUTPATIENT
Start: 2024-12-19 | End: 2024-12-24 | Stop reason: HOSPADM

## 2024-12-19 RX ORDER — HYDROMORPHONE HCL IN WATER/PF 6 MG/30 ML
0.2 PATIENT CONTROLLED ANALGESIA SYRINGE INTRAVENOUS EVERY 4 HOURS PRN
Status: DISCONTINUED | OUTPATIENT
Start: 2024-12-19 | End: 2024-12-23

## 2024-12-19 RX ORDER — DEXAMETHASONE SODIUM PHOSPHATE 4 MG/ML
4 INJECTION, SOLUTION INTRA-ARTICULAR; INTRALESIONAL; INTRAMUSCULAR; INTRAVENOUS; SOFT TISSUE
Status: DISCONTINUED | OUTPATIENT
Start: 2024-12-19 | End: 2024-12-19 | Stop reason: HOSPADM

## 2024-12-19 RX ORDER — NALOXONE HYDROCHLORIDE 0.4 MG/ML
0.1 INJECTION, SOLUTION INTRAMUSCULAR; INTRAVENOUS; SUBCUTANEOUS
Status: DISCONTINUED | OUTPATIENT
Start: 2024-12-19 | End: 2024-12-19 | Stop reason: HOSPADM

## 2024-12-19 RX ORDER — BUPIVACAINE HYDROCHLORIDE 2.5 MG/ML
INJECTION, SOLUTION EPIDURAL; INFILTRATION; INTRACAUDAL
Status: COMPLETED | OUTPATIENT
Start: 2024-12-19 | End: 2024-12-19

## 2024-12-19 RX ORDER — ONDANSETRON 2 MG/ML
4 INJECTION INTRAMUSCULAR; INTRAVENOUS EVERY 30 MIN PRN
Status: DISCONTINUED | OUTPATIENT
Start: 2024-12-19 | End: 2024-12-19

## 2024-12-19 RX ORDER — HYDROMORPHONE HCL IN WATER/PF 6 MG/30 ML
0.1 PATIENT CONTROLLED ANALGESIA SYRINGE INTRAVENOUS EVERY 4 HOURS PRN
Status: DISCONTINUED | OUTPATIENT
Start: 2024-12-19 | End: 2024-12-23

## 2024-12-19 RX ORDER — ACETAMINOPHEN 325 MG/1
975 TABLET ORAL EVERY 8 HOURS
Status: DISCONTINUED | OUTPATIENT
Start: 2024-12-19 | End: 2024-12-24 | Stop reason: HOSPADM

## 2024-12-19 RX ORDER — DIPHENHYDRAMINE HCL 12.5MG/5ML
12.5 LIQUID (ML) ORAL EVERY 6 HOURS PRN
Status: DISCONTINUED | OUTPATIENT
Start: 2024-12-19 | End: 2024-12-24 | Stop reason: HOSPADM

## 2024-12-19 RX ORDER — PIPERACILLIN SODIUM, TAZOBACTAM SODIUM 3; .375 G/15ML; G/15ML
3.38 INJECTION, POWDER, LYOPHILIZED, FOR SOLUTION INTRAVENOUS EVERY 6 HOURS
Status: DISCONTINUED | OUTPATIENT
Start: 2024-12-19 | End: 2024-12-19

## 2024-12-19 RX ORDER — SODIUM CHLORIDE, SODIUM LACTATE, POTASSIUM CHLORIDE, CALCIUM CHLORIDE 600; 310; 30; 20 MG/100ML; MG/100ML; MG/100ML; MG/100ML
INJECTION, SOLUTION INTRAVENOUS CONTINUOUS
Status: DISCONTINUED | OUTPATIENT
Start: 2024-12-19 | End: 2024-12-19 | Stop reason: HOSPADM

## 2024-12-19 RX ORDER — FLUMAZENIL 0.1 MG/ML
0.2 INJECTION, SOLUTION INTRAVENOUS
Status: DISCONTINUED | OUTPATIENT
Start: 2024-12-19 | End: 2024-12-19 | Stop reason: HOSPADM

## 2024-12-19 RX ORDER — NALOXONE HYDROCHLORIDE 0.4 MG/ML
0.2 INJECTION, SOLUTION INTRAMUSCULAR; INTRAVENOUS; SUBCUTANEOUS
Status: DISCONTINUED | OUTPATIENT
Start: 2024-12-19 | End: 2024-12-19 | Stop reason: HOSPADM

## 2024-12-19 RX ORDER — LIDOCAINE HYDROCHLORIDE 20 MG/ML
INJECTION, SOLUTION INFILTRATION; PERINEURAL PRN
Status: DISCONTINUED | OUTPATIENT
Start: 2024-12-19 | End: 2024-12-19

## 2024-12-19 RX ORDER — DEXAMETHASONE SODIUM PHOSPHATE 4 MG/ML
4 INJECTION, SOLUTION INTRA-ARTICULAR; INTRALESIONAL; INTRAMUSCULAR; INTRAVENOUS; SOFT TISSUE
Status: DISCONTINUED | OUTPATIENT
Start: 2024-12-19 | End: 2024-12-19

## 2024-12-19 RX ORDER — NALOXONE HYDROCHLORIDE 0.4 MG/ML
0.4 INJECTION, SOLUTION INTRAMUSCULAR; INTRAVENOUS; SUBCUTANEOUS
Status: DISCONTINUED | OUTPATIENT
Start: 2024-12-19 | End: 2024-12-19 | Stop reason: HOSPADM

## 2024-12-19 RX ORDER — NALOXONE HYDROCHLORIDE 0.4 MG/ML
0.2 INJECTION, SOLUTION INTRAMUSCULAR; INTRAVENOUS; SUBCUTANEOUS
Status: DISCONTINUED | OUTPATIENT
Start: 2024-12-19 | End: 2024-12-24 | Stop reason: HOSPADM

## 2024-12-19 RX ORDER — HYDROMORPHONE HYDROCHLORIDE 1 MG/ML
0.4 INJECTION, SOLUTION INTRAMUSCULAR; INTRAVENOUS; SUBCUTANEOUS EVERY 5 MIN PRN
Status: DISCONTINUED | OUTPATIENT
Start: 2024-12-19 | End: 2024-12-19 | Stop reason: HOSPADM

## 2024-12-19 RX ORDER — PROPOFOL 10 MG/ML
INJECTION, EMULSION INTRAVENOUS PRN
Status: DISCONTINUED | OUTPATIENT
Start: 2024-12-19 | End: 2024-12-19

## 2024-12-19 RX ORDER — ONDANSETRON 4 MG/1
4 TABLET, ORALLY DISINTEGRATING ORAL EVERY 30 MIN PRN
Status: DISCONTINUED | OUTPATIENT
Start: 2024-12-19 | End: 2024-12-19

## 2024-12-19 RX ORDER — PIPERACILLIN SODIUM, TAZOBACTAM SODIUM 3; .375 G/15ML; G/15ML
3.38 INJECTION, POWDER, LYOPHILIZED, FOR SOLUTION INTRAVENOUS EVERY 6 HOURS
Status: DISCONTINUED | OUTPATIENT
Start: 2024-12-19 | End: 2024-12-19 | Stop reason: HOSPADM

## 2024-12-19 RX ORDER — FLUCONAZOLE 2 MG/ML
400 INJECTION, SOLUTION INTRAVENOUS EVERY 24 HOURS
Status: DISCONTINUED | OUTPATIENT
Start: 2024-12-20 | End: 2024-12-20

## 2024-12-19 RX ORDER — DEXTROSE MONOHYDRATE 100 MG/ML
INJECTION, SOLUTION INTRAVENOUS CONTINUOUS PRN
Status: DISCONTINUED | OUTPATIENT
Start: 2024-12-19 | End: 2024-12-24 | Stop reason: HOSPADM

## 2024-12-19 RX ORDER — FENTANYL CITRATE 50 UG/ML
25 INJECTION, SOLUTION INTRAMUSCULAR; INTRAVENOUS EVERY 5 MIN PRN
Status: DISCONTINUED | OUTPATIENT
Start: 2024-12-19 | End: 2024-12-19 | Stop reason: HOSPADM

## 2024-12-19 RX ORDER — BISACODYL 10 MG
10 SUPPOSITORY, RECTAL RECTAL DAILY PRN
Status: DISCONTINUED | OUTPATIENT
Start: 2024-12-22 | End: 2024-12-24 | Stop reason: HOSPADM

## 2024-12-19 RX ORDER — OXYCODONE HYDROCHLORIDE 5 MG/1
5 TABLET ORAL
Status: DISCONTINUED | OUTPATIENT
Start: 2024-12-19 | End: 2024-12-19

## 2024-12-19 RX ORDER — DIPHENHYDRAMINE HYDROCHLORIDE 50 MG/ML
12.5 INJECTION INTRAMUSCULAR; INTRAVENOUS EVERY 6 HOURS PRN
Status: DISCONTINUED | OUTPATIENT
Start: 2024-12-19 | End: 2024-12-24 | Stop reason: HOSPADM

## 2024-12-19 RX ORDER — ONDANSETRON 4 MG/1
4 TABLET, ORALLY DISINTEGRATING ORAL EVERY 6 HOURS PRN
Status: DISCONTINUED | OUTPATIENT
Start: 2024-12-19 | End: 2024-12-24 | Stop reason: HOSPADM

## 2024-12-19 RX ORDER — FLUCONAZOLE 2 MG/ML
400 INJECTION, SOLUTION INTRAVENOUS EVERY 24 HOURS
Status: DISCONTINUED | OUTPATIENT
Start: 2024-12-19 | End: 2024-12-19

## 2024-12-19 RX ORDER — HYDROMORPHONE HYDROCHLORIDE 1 MG/ML
0.2 INJECTION, SOLUTION INTRAMUSCULAR; INTRAVENOUS; SUBCUTANEOUS EVERY 5 MIN PRN
Status: DISCONTINUED | OUTPATIENT
Start: 2024-12-19 | End: 2024-12-19 | Stop reason: HOSPADM

## 2024-12-19 RX ORDER — DEXAMETHASONE SODIUM PHOSPHATE 4 MG/ML
INJECTION, SOLUTION INTRA-ARTICULAR; INTRALESIONAL; INTRAMUSCULAR; INTRAVENOUS; SOFT TISSUE PRN
Status: DISCONTINUED | OUTPATIENT
Start: 2024-12-19 | End: 2024-12-19

## 2024-12-19 RX ORDER — LIDOCAINE 40 MG/G
CREAM TOPICAL
Status: DISCONTINUED | OUTPATIENT
Start: 2024-12-19 | End: 2024-12-19 | Stop reason: HOSPADM

## 2024-12-19 RX ORDER — FENTANYL CITRATE 50 UG/ML
INJECTION, SOLUTION INTRAMUSCULAR; INTRAVENOUS PRN
Status: DISCONTINUED | OUTPATIENT
Start: 2024-12-19 | End: 2024-12-19

## 2024-12-19 RX ORDER — LIDOCAINE 40 MG/G
CREAM TOPICAL
Status: DISCONTINUED | OUTPATIENT
Start: 2024-12-19 | End: 2024-12-24 | Stop reason: HOSPADM

## 2024-12-19 RX ORDER — VANCOMYCIN HYDROCHLORIDE
1500 EVERY 24 HOURS
Status: DISCONTINUED | OUTPATIENT
Start: 2024-12-19 | End: 2024-12-19

## 2024-12-19 RX ORDER — POLYETHYLENE GLYCOL 3350 17 G/17G
17 POWDER, FOR SOLUTION ORAL DAILY
Status: DISCONTINUED | OUTPATIENT
Start: 2024-12-20 | End: 2024-12-24 | Stop reason: HOSPADM

## 2024-12-19 RX ORDER — METHOCARBAMOL 500 MG/1
250 TABLET ORAL EVERY 6 HOURS PRN
Status: DISCONTINUED | OUTPATIENT
Start: 2024-12-19 | End: 2024-12-24 | Stop reason: HOSPADM

## 2024-12-19 RX ORDER — OXYCODONE HYDROCHLORIDE 5 MG/1
5 TABLET ORAL EVERY 4 HOURS PRN
Status: DISCONTINUED | OUTPATIENT
Start: 2024-12-19 | End: 2024-12-23

## 2024-12-19 RX ORDER — ONDANSETRON 4 MG/1
4 TABLET, ORALLY DISINTEGRATING ORAL EVERY 30 MIN PRN
Status: DISCONTINUED | OUTPATIENT
Start: 2024-12-19 | End: 2024-12-19 | Stop reason: HOSPADM

## 2024-12-19 RX ORDER — SODIUM CHLORIDE, SODIUM LACTATE, POTASSIUM CHLORIDE, CALCIUM CHLORIDE 600; 310; 30; 20 MG/100ML; MG/100ML; MG/100ML; MG/100ML
INJECTION, SOLUTION INTRAVENOUS CONTINUOUS PRN
Status: DISCONTINUED | OUTPATIENT
Start: 2024-12-19 | End: 2024-12-19

## 2024-12-19 RX ORDER — VANCOMYCIN HYDROCHLORIDE 1 G/200ML
1000 INJECTION, SOLUTION INTRAVENOUS ONCE
Status: COMPLETED | OUTPATIENT
Start: 2024-12-19 | End: 2024-12-19

## 2024-12-19 RX ORDER — FENTANYL CITRATE 50 UG/ML
50 INJECTION, SOLUTION INTRAMUSCULAR; INTRAVENOUS EVERY 5 MIN PRN
Status: DISCONTINUED | OUTPATIENT
Start: 2024-12-19 | End: 2024-12-19 | Stop reason: HOSPADM

## 2024-12-19 RX ORDER — ONDANSETRON 2 MG/ML
INJECTION INTRAMUSCULAR; INTRAVENOUS PRN
Status: DISCONTINUED | OUTPATIENT
Start: 2024-12-19 | End: 2024-12-19

## 2024-12-19 RX ORDER — TAMSULOSIN HYDROCHLORIDE 0.4 MG/1
0.4 CAPSULE ORAL DAILY
Status: DISCONTINUED | OUTPATIENT
Start: 2024-12-19 | End: 2024-12-20

## 2024-12-19 RX ORDER — OXYCODONE HYDROCHLORIDE 10 MG/1
10 TABLET ORAL
Status: DISCONTINUED | OUTPATIENT
Start: 2024-12-19 | End: 2024-12-19

## 2024-12-19 RX ORDER — SODIUM CHLORIDE, SODIUM LACTATE, POTASSIUM CHLORIDE, CALCIUM CHLORIDE 600; 310; 30; 20 MG/100ML; MG/100ML; MG/100ML; MG/100ML
INJECTION, SOLUTION INTRAVENOUS CONTINUOUS
Status: ACTIVE | OUTPATIENT
Start: 2024-12-19 | End: 2024-12-19

## 2024-12-19 RX ORDER — NALOXONE HYDROCHLORIDE 0.4 MG/ML
0.1 INJECTION, SOLUTION INTRAMUSCULAR; INTRAVENOUS; SUBCUTANEOUS
Status: DISCONTINUED | OUTPATIENT
Start: 2024-12-19 | End: 2024-12-19

## 2024-12-19 RX ORDER — PROCHLORPERAZINE MALEATE 5 MG/1
5 TABLET ORAL EVERY 6 HOURS PRN
Status: DISCONTINUED | OUTPATIENT
Start: 2024-12-19 | End: 2024-12-24 | Stop reason: HOSPADM

## 2024-12-19 RX ORDER — FENTANYL CITRATE 50 UG/ML
25-50 INJECTION, SOLUTION INTRAMUSCULAR; INTRAVENOUS
Status: DISCONTINUED | OUTPATIENT
Start: 2024-12-19 | End: 2024-12-19 | Stop reason: HOSPADM

## 2024-12-19 RX ORDER — VANCOMYCIN HYDROCHLORIDE
1500 EVERY 24 HOURS
Status: DISCONTINUED | OUTPATIENT
Start: 2024-12-20 | End: 2024-12-22

## 2024-12-19 RX ORDER — FLUCONAZOLE 2 MG/ML
INJECTION, SOLUTION INTRAVENOUS PRN
Status: DISCONTINUED | OUTPATIENT
Start: 2024-12-19 | End: 2024-12-19

## 2024-12-19 RX ADMIN — PIPERACILLIN AND TAZOBACTAM 3.38 G: 3; .375 INJECTION, POWDER, FOR SOLUTION INTRAVENOUS at 11:57

## 2024-12-19 RX ADMIN — FLUCONAZOLE IN SODIUM CHLORIDE 200 MG: 2 INJECTION, SOLUTION INTRAVENOUS at 13:46

## 2024-12-19 RX ADMIN — LIDOCAINE HYDROCHLORIDE 80 MG: 20 INJECTION, SOLUTION INFILTRATION; PERINEURAL at 11:52

## 2024-12-19 RX ADMIN — FENTANYL CITRATE 50 MCG: 50 INJECTION INTRAMUSCULAR; INTRAVENOUS at 12:32

## 2024-12-19 RX ADMIN — SENNOSIDES AND DOCUSATE SODIUM 1 TABLET: 50; 8.6 TABLET ORAL at 19:40

## 2024-12-19 RX ADMIN — SODIUM CHLORIDE, POTASSIUM CHLORIDE, SODIUM LACTATE AND CALCIUM CHLORIDE: 600; 310; 30; 20 INJECTION, SOLUTION INTRAVENOUS at 11:44

## 2024-12-19 RX ADMIN — ALBUMIN (HUMAN): 12.5 SOLUTION INTRAVENOUS at 13:46

## 2024-12-19 RX ADMIN — ASCORBIC ACID, VITAMIN A PALMITATE, CHOLECALCIFEROL, THIAMINE HYDROCHLORIDE, RIBOFLAVIN-5 PHOSPHATE SODIUM, PYRIDOXINE HYDROCHLORIDE, NIACINAMIDE, DEXPANTHENOL, ALPHA-TOCOPHEROL ACETATE, VITAMIN K1, FOLIC ACID, BIOTIN, CYANOCOBALAMIN: 200; 3300; 200; 6; 3.6; 6; 40; 15; 10; 150; 600; 60; 5 INJECTION, SOLUTION INTRAVENOUS at 21:09

## 2024-12-19 RX ADMIN — Medication 20 MG: at 12:22

## 2024-12-19 RX ADMIN — BUPIVACAINE HYDROCHLORIDE 10 ML: 2.5 INJECTION, SOLUTION EPIDURAL; INFILTRATION; INTRACAUDAL; PERINEURAL at 11:58

## 2024-12-19 RX ADMIN — Medication 20 MG: at 13:21

## 2024-12-19 RX ADMIN — Medication 50 MG: at 11:52

## 2024-12-19 RX ADMIN — VANCOMYCIN HYDROCHLORIDE 1000 MG: 1 INJECTION, SOLUTION INTRAVENOUS at 11:57

## 2024-12-19 RX ADMIN — Medication 100 MG: at 14:45

## 2024-12-19 RX ADMIN — FENTANYL CITRATE 50 MCG: 50 INJECTION INTRAMUSCULAR; INTRAVENOUS at 11:52

## 2024-12-19 RX ADMIN — ONDANSETRON 4 MG: 2 INJECTION INTRAMUSCULAR; INTRAVENOUS at 14:27

## 2024-12-19 RX ADMIN — SODIUM CHLORIDE, POTASSIUM CHLORIDE, SODIUM LACTATE AND CALCIUM CHLORIDE: 600; 310; 30; 20 INJECTION, SOLUTION INTRAVENOUS at 18:41

## 2024-12-19 RX ADMIN — HYDROMORPHONE HYDROCHLORIDE 0.5 MG: 1 INJECTION, SOLUTION INTRAMUSCULAR; INTRAVENOUS; SUBCUTANEOUS at 13:28

## 2024-12-19 RX ADMIN — DEXAMETHASONE SODIUM PHOSPHATE 4 MG: 4 INJECTION, SOLUTION INTRA-ARTICULAR; INTRALESIONAL; INTRAMUSCULAR; INTRAVENOUS; SOFT TISSUE at 12:12

## 2024-12-19 RX ADMIN — Medication 100 MG: at 14:42

## 2024-12-19 RX ADMIN — ACETAMINOPHEN 975 MG: 325 TABLET, FILM COATED ORAL at 18:35

## 2024-12-19 RX ADMIN — PIPERACILLIN SODIUM AND TAZOBACTAM SODIUM 3.38 G: 3; .375 INJECTION, SOLUTION INTRAVENOUS at 19:47

## 2024-12-19 RX ADMIN — BENZOCAINE 1 ML: 220 SPRAY, METERED PERIODONTAL at 20:40

## 2024-12-19 RX ADMIN — OLIVE OIL AND SOYBEAN OIL 250 ML: 16; 4 INJECTION, EMULSION INTRAVENOUS at 21:09

## 2024-12-19 RX ADMIN — BUPIVACAINE 10 ML: 13.3 INJECTION, SUSPENSION, LIPOSOMAL INFILTRATION at 11:58

## 2024-12-19 RX ADMIN — PROPOFOL 150 MG: 10 INJECTION, EMULSION INTRAVENOUS at 11:52

## 2024-12-19 RX ADMIN — PIPERACILLIN AND TAZOBACTAM 3.38 G: 3; .375 INJECTION, POWDER, FOR SOLUTION INTRAVENOUS at 13:57

## 2024-12-19 ASSESSMENT — ACTIVITIES OF DAILY LIVING (ADL)
ADLS_ACUITY_SCORE: 17
ADLS_ACUITY_SCORE: 17
ADLS_ACUITY_SCORE: 18
ADLS_ACUITY_SCORE: 17
ADLS_ACUITY_SCORE: 23
ADLS_ACUITY_SCORE: 15
ADLS_ACUITY_SCORE: 20
ADLS_ACUITY_SCORE: 23
ADLS_ACUITY_SCORE: 15
ADLS_ACUITY_SCORE: 17
ADLS_ACUITY_SCORE: 23
ADLS_ACUITY_SCORE: 20
ADLS_ACUITY_SCORE: 23
ADLS_ACUITY_SCORE: 23

## 2024-12-19 NOTE — OP NOTE
[facility]    Operative Note    Pre-operative diagnosis: Hernia, inguinal [K40.90]   Post-operative diagnosis Left inguinal hernia, sliding with chronically incarcerated sigmoid colon, with a small abscess    Procedure: Procedure(s):  Repair of sliding  inguinal hernia repair with mesh. Repair of sigmoid colon enterotomy.   Surgeon: Surgeons and Role:     * Russ Dominguez MD - Primary     * Rohan Mackey MD - Fellow - Assisting   Anesthesia: General with Block    Estimated blood loss: Less than 50 ml   Drains: Rj-Kim   Specimens: ID Type Source Tests Collected by Time Destination   1 : hernia sac Tissue Hernia Sac, Inguinal, Left SURGICAL PATHOLOGY EXAM Russ Dominguez MD 12/19/2024  2:13 PM    A : abdominal pus Abscess Abdomen ANAEROBIC BACTERIAL CULTURE ROUTINE, GRAM STAIN, FUNGAL OR YEAST CULTURE ROUTINE, AEROBIC BACTERIAL CULTURE ROUTINE Russ Dominguez MD 12/19/2024  1:28 PM       Findings:   Dense adhesions of the sigmoid colon as a component of a sliding hernia that was adherent to the cord structures  Phlegmon in the indirect inguinal hernia, 2 cm abscess, sent for culture. A loop of the sigmoid colon was stuck to the apex of the hernia sac. The contents also included a large tongue of omentum stuck to the apex of the hernia sac   Complications: Colotomy during hernia repair,  identified and repaired during the operation   Implants: None.           INDICATIONS FOR PROCEDURE  Les Morel is a 74 year old male who had an recurrent indirect left inguinal hernia .  There was a worsening in his pain, bulge and he had cirrhosis.    After understanding the risks and benefits of proceeding with a open inguinal hernia with possible mesh, he agreed to an operation.    General risks related to abdominal surgery were reviewed with the patient.    These include, but are not limited to, death, myocardial infarction, pneumonia, urinary tract  infection, deep venous thrombosis with or without pulmonary embolus, abdominal infection from bowel injury or abscess, bowel obstruction, wound infection, and bleeding.      OPERATIVE PROCEDURE:      The patient was brought to the operating room and placed supine on the table. A block was performed by anesthesia. Smooth general endotracheal anesthesia was administered.     We began by prepping and draping the patient. We prepped in the scrotum. We began with an incision running parallel to the inguinal ligament one finger breadth above the inguinal canal. We dissected down to the external oblique aponeurosis. We were meticulous about hemostasis given the patients anticoagulation and cirrhosis. We incised the external oblique. We carried our dissection down to the external ring which we opened. We gently removed the connective tissues from the underside of the external oblique with a kitner. We then encircled the hernia and cord structures with a penrose. We then identified the sac. The sac was denseley adherent to the cord. We opened the sac. We noted that when we finally opened the sac there was a colotomy  made likely when we opened the sac. This was less than 1/3 of the circumference of the sigmoid colon. There was minimal contamination. We repaired this with a running 4-0 PDS and second layer over sewed it with 4-0 silk. It looked healthy and well closed with a large lumen and no narrowing. We noted a 2 cm abscess stuck to the colon, we drained it and sent for cultures. There were 2 other inflamed cord like structures from the colon to the hernial sac, densely adherent ? Diverticula, we stapled them and tied them as well and divided them from the hernial sac. We Competed dissected the cord structures free. We identified and divided the vas. We reduced the hernia, which had a large amount of omentum, and the colon back into the abdominal cavity. We closed the sac. We irrigated the field. We closed the wide mouth  hernia sac with 2-0 PDS, we elected to do a tissue repair using the Bassini technique  due to the colon injury.the floor was closed nicely without tension with interrupted 2-0 prolene. We then closed the external oblique with a running 3-0 vicryl. We closed the scarpas layer and then stapled the skin.      Dr. Dominguez performed the entire operation.    All sponge and needle counts were correct x 2 at the end of the procedure.    Jeison Mackey MD, PhD  Transplant Surgery Fellow  Physician Attestation   I was present for the entire procedure between opening and closing.    Russ Dominguez MD  Date of Service (when I saw the patient): 12/19/24  The transplant fellow, Dr Narendra STUART PhD, was present and assisted with all aspects of the operation described above from opening to closing.  There was no suitable surgical resident available to assist in this procedure. I performed the entire procedure

## 2024-12-19 NOTE — ANESTHESIA PREPROCEDURE EVALUATION
Anesthesia Pre-Procedure Evaluation    Patient: Les Morel   MRN: [id number] : 1950        Procedure : Procedure(s):  Open inguinal hernia repair with mesh          Past Medical History:   Diagnosis Date    Alcoholic cirrhosis of liver without ascites (H) 10/28/2024    Gout     Hernia, inguinal 10/28/2024    ILD (interstitial lung disease) (H)     Portal hypertension (H)     Recurrent pulmonary embolism (H)     S/P TIPS (transjugular intrahepatic portosystemic shunt)       Past Surgical History:   Procedure Laterality Date    CHOLECYSTECTOMY, OPEN      HERNIA REPAIR      age 7    HERNIA REPAIR      recurrent ventral hernia    TIPS PROCEDURE      x3, last 2024      Allergies   Allergen Reactions    Food Hives     tangerines    Other Environmental Allergy Other (See Comments) and Itching     pollens      Social History     Tobacco Use    Smoking status: Never     Passive exposure: Never    Smokeless tobacco: Never   Substance Use Topics    Alcohol use: Not Currently     Comment: Sober 2023      Wt Readings from Last 1 Encounters:   24 60.1 kg (132 lb 7.9 oz)        Anesthesia Evaluation            ROS/MED HX  ENT/Pulmonary: Comment: Recurrent P.E.s on coumadin. Bridged with lovenox for surgery      Neurologic:       Cardiovascular:       METS/Exercise Tolerance:     Hematologic:       Musculoskeletal:       GI/Hepatic: Comment: Cirrhosis s/p TIPS    (+)             liver disease,       Renal/Genitourinary:       Endo:       Psychiatric/Substance Use:       Infectious Disease:       Malignancy:       Other:            Physical Exam    Airway        Mallampati: II   TM distance: > 3 FB   Neck ROM: full   Mouth opening: > 3 cm    Respiratory Devices and Support         Dental     Comment: Loose molars on both sides awaiting removal. Moderate dentition otherwise.        Cardiovascular          Rhythm and rate: regular and normal     Pulmonary           breath sounds clear to auscultation  "          OUTSIDE LABS:  CBC:   Lab Results   Component Value Date    WBC 7.2 11/18/2024    HGB 10.6 (L) 11/18/2024    HCT 34.6 (L) 11/18/2024     11/18/2024     BMP:   Lab Results   Component Value Date     11/18/2024    POTASSIUM 4.9 11/18/2024    CHLORIDE 106 11/18/2024    CO2 25 11/18/2024    BUN 21.4 11/18/2024    CR 0.97 11/18/2024     (H) 11/18/2024     COAGS:   Lab Results   Component Value Date    INR 1.65 (H) 11/18/2024     POC: No results found for: \"BGM\", \"HCG\", \"HCGS\"  HEPATIC:   Lab Results   Component Value Date    ALBUMIN 3.3 (L) 11/18/2024    PROTTOTAL 8.0 11/18/2024    ALT 10 11/18/2024    AST 24 11/18/2024    ALKPHOS 106 11/18/2024    BILITOTAL 0.7 11/18/2024     OTHER:   Lab Results   Component Value Date    CLARIBEL 9.2 11/18/2024       Anesthesia Plan    ASA Status:  3    NPO Status:  NPO Appropriate    Anesthesia Type: General.     - Airway: ETT   Induction: Intravenous.   Maintenance: Balanced.        Consents    Anesthesia Plan(s) and associated risks, benefits, and realistic alternatives discussed. Questions answered and patient/representative(s) expressed understanding.     - Discussed:     - Discussed with:  Patient      - Extended Intubation/Ventilatory Support Discussed: No.      - Patient is DNR/DNI Status: No     Use of blood products discussed: No .     Postoperative Care    Pain management: IV analgesics, Peripheral nerve block (Single Shot), Multi-modal analgesia.   PONV prophylaxis: Ondansetron (or other 5HT-3), Dexamethasone or Solumedrol     Comments:    Other Comments: NPO. Meds reviewed. No problems with anesthesia in past. No recent URI. No CP or SOB.  Cirrhosis seems stable, per recent note. TIPS may require a revision but ok for now.           Adrianna Cantu MD    I have reviewed the pertinent notes and labs in the chart from the past 30 days and (re)examined the patient.  Any updates or changes from those notes are reflected in this note.            # Drug " Induced Coagulation Defect: home medication list includes an anticoagulant medication

## 2024-12-19 NOTE — ANESTHESIA CARE TRANSFER NOTE
Patient: Les Morel    Procedure: Procedure(s):  Repair of sliding  inguinal hernia repair with mesh. Repair of sigmoid colon enterotomy.       Diagnosis: Hernia, inguinal [K40.90]  Diagnosis Additional Information: No value filed.    Anesthesia Type:   No value filed.     Note:    Oropharynx: oropharynx clear of all foreign objects and spontaneously breathing  Level of Consciousness: drowsy  Oxygen Supplementation: room air    Independent Airway: airway patency satisfactory and stable  Dentition: dentition unchanged  Vital Signs Stable: post-procedure vital signs reviewed and stable  Report to RN Given: handoff report given  Patient transferred to: PACU    Handoff Report: Identifed the Patient, Identified the Reponsible Provider, Reviewed the pertinent medical history, Discussed the surgical course, Reviewed Intra-OP anesthesia mangement and issues during anesthesia, Set expectations for post-procedure period and Allowed opportunity for questions and acknowledgement of understanding      Vitals:  Vitals Value Taken Time   /56 12/19/24 1500   Temp 36.6  C (97.8  F) 12/19/24 1501   Pulse 72 12/19/24 1501   Resp 13 12/19/24 1501   SpO2 92 % 12/19/24 1501   Vitals shown include unfiled device data.    Electronically Signed By: UMAIR Gomez CRNA  December 19, 2024  3:02 PM

## 2024-12-19 NOTE — ANESTHESIA PROCEDURE NOTES
Airway       Patient location during procedure: OR       Procedure Start/Stop Times: 12/19/2024 11:56 AM  Staff -        CRNA: Crow Oliver APRN CRNA       Performed By: CRNA  Consent for Airway        Urgency: elective  Indications and Patient Condition       Indications for airway management: claudette-procedural       Induction type:intravenous       Mask difficulty assessment: 1 - vent by mask    Final Airway Details       Final airway type: endotracheal airway       Successful airway: ETT - single and Oral  Endotracheal Airway Details        ETT size (mm): 7.5       Cuffed: yes       Successful intubation technique: direct laryngoscopy       DL Blade Type: MAC 3       Grade View of Cords: 1       Adjucts: stylet       Position: Right       Measured from: lips       Secured at (cm): 23       Bite block used: None    Post intubation assessment        Placement verified by: capnometry, equal breath sounds and chest rise        Number of attempts at approach: 1       Number of other approaches attempted: 0       Secured with: tape       Ease of procedure: easy       Dentition: Intact and Unchanged    Medication(s) Administered   Medication Administration Time: 12/19/2024 11:56 AM

## 2024-12-19 NOTE — BRIEF OP NOTE
[facility]    Brief Operative Note    Pre-operative diagnosis: Hernia, inguinal [K40.90]  Post-operative diagnosis Same as pre-operative diagnosis    Procedure: Repair of sliding  inguinal hernia repair with mesh. Repair of sigmoid colon enterotomy., Left - Update    Surgeon: Surgeons and Role:     * Russ Dominguez MD - Primary     * Rohan Mackey MD - Fellow - Assisting  Anesthesia: General with Block   Estimated Blood Loss: 50 mL from 12/19/2024 11:43 AM to 12/19/2024  2:51 PM      Drains: None  Specimens:   ID Type Source Tests Collected by Time Destination   1 : hernia sac Tissue Hernia Sac, Inguinal, Left SURGICAL PATHOLOGY EXAM Russ Dominguez MD 12/19/2024  2:13 PM    A : abdominal pus Abscess Abdomen ANAEROBIC BACTERIAL CULTURE ROUTINE, GRAM STAIN, FUNGAL OR YEAST CULTURE ROUTINE, AEROBIC BACTERIAL CULTURE ROUTINE Russ Dominguez MD 12/19/2024  1:28 PM      Findings:   Sliding hernia.  Complications: Colotomy during open hernia repair, repaired primarily.  Implants: * No implants in log *

## 2024-12-19 NOTE — ANESTHESIA PROCEDURE NOTES
TAP Procedure Note    Pre-Procedure   Staff -        Anesthesiologist:  Sidney Mojica MD       Resident/Fellow: Adriane Connelly MD       Performed By: anesthesiologist and resident       Location: OR       Procedure Start/Stop Times: 12/19/2024 11:58 AM       Pre-Anesthestic Checklist: patient identified, IV checked, site marked, risks and benefits discussed, informed consent, monitors and equipment checked, pre-op evaluation, at physician/surgeon's request and post-op pain management  Timeout:       Correct Patient: Yes        Correct Procedure: Yes        Correct Site: Yes        Correct Position: Yes        Correct Laterality: Yes        Site Marked: Yes  Procedure Documentation  Procedure: TAP       Diagnosis: POST-OPERATIVE ANALGESIA       Laterality: left       Patient Position: supine       Skin prep: Chloraprep       Insertion Site: T10-11.       Needle Type: insulated       Needle Gauge: 21.        Needle Length (millimeters): 110        Ultrasound guided       1. Ultrasound was used to identify targeted nerve, plexus, vascular marker, or fascial plane and place a needle adjacent to it in real-time.       2. Ultrasound was used to visualize the spread of anesthetic in close proximity to the above referenced structure.    Assessment/Narrative         The placement was negative for: blood aspirated, painful injection and site bleeding       Paresthesias: No.       Bolus given via needle. no blood aspirated via catheter.        Secured via.        Insertion/Infusion Method: Single Shot       Complications: none    Medication(s) Administered   Bupivacaine 0.25% PF (Infiltration) - Infiltration   10 mL - 12/19/2024 11:58:00 AM  Bupivacaine liposome (Exparel) 1.3% LA inj susp (Infiltration) - Infiltration   10 mL - 12/19/2024 11:58:00 AM  Medication Administration Time: 12/19/2024 11:58 AM      FOR [facility] ([facility] [place] ONLY:   Pain Team Contact information: please page the Pain Team Via [employer]. Search  "\"Pain\". During daytime hours, please page the attending first. At night please page the resident first.      "

## 2024-12-19 NOTE — PHARMACY-VANCOMYCIN DOSING SERVICE
"Pharmacy Vancomycin Initial Note  Date of Service 2024  Patient's  1950  74 year old, male    Indication:  enterotomy    Current estimated CrCl = Estimated Creatinine Clearance: 60.4 mL/min (based on SCr of 0.94 mg/dL).    Creatinine for last 3 days  2024:  4:36 PM Creatinine 0.94 mg/dL;  4:36 PM Creatinine 0.94 mg/dL    Recent Vancomycin Level(s) for last 3 days  No results found for requested labs within last 3 days.      Vancomycin IV Administrations (past 72 hours)                     vancomycin (VANCOCIN) 1,000 mg in 200 mL dextrose intermittent infusion (mg) 1,000 mg Given 24 1157                    Nephrotoxins and other renal medications (From now, onward)      Start     Dose/Rate Route Frequency Ordered Stop    24 1730  piperacillin-tazobactam (ZOSYN) 3.375 g vial to attach to  mL bag        Note to Pharmacy: For [facility], [facility] and [facility]: For Zosyn-naive patients, use the \"Zosyn initial dose + extended infusion\" order panel.    3.375 g  over 30 Minutes Intravenous EVERY 6 HOURS 24 1553 24 1729    24 1730  vancomycin (VANCOCIN) 1,500 mg in 0.9% NaCl 250 mL intermittent infusion         1,500 mg  166.7 mL/hr over 90 Minutes Intravenous EVERY 24 HOURS 24 1722              Contrast Orders - past 72 hours (72h ago, onward)      None            [employer] Prediction of Planned Initial Vancomycin Regimen  Loading dose: N/A  Regimen: 1500 mg IV every 24 hours.  Start time: 06:00 on 2024  Exposure target: AUC24 (range)400-600 mg/L.hr   AUC24,ss: 559 mg/L.hr  Probability of AUC24 > 400: 84 %  Ctrough,ss: 15.5 mg/L  Probability of Ctrough,ss > 20: 28 %  Probability of nephrotoxicity (Lodise COLIN ): 11 %            Plan:  Start vancomycin  1500 mg IV q24h.  Will start this dose at ~6 AM on  which is ~18 hr from his 1000 mg IV dose given during procedure.   Vancomycin monitoring method: AUC  Vancomycin therapeutic monitoring goal: 400-600 " mg*h/L  Pharmacy will check vancomycin levels as appropriate in 1-3 Days.    Serum creatinine levels will be ordered daily for the first week of therapy and at least twice weekly for subsequent weeks.      Tripp Plummer RPH

## 2024-12-20 LAB
ALBUMIN SERPL BCG-MCNC: 3.2 G/DL (ref 3.5–5.2)
ALP SERPL-CCNC: 77 U/L (ref 40–150)
ALT SERPL W P-5'-P-CCNC: <5 U/L (ref 0–70)
ANION GAP SERPL CALCULATED.3IONS-SCNC: 8 MMOL/L (ref 7–15)
AST SERPL W P-5'-P-CCNC: 24 U/L (ref 0–45)
BILIRUB DIRECT SERPL-MCNC: 0.32 MG/DL (ref 0–0.3)
BILIRUB SERPL-MCNC: 0.9 MG/DL
BUN SERPL-MCNC: 18.2 MG/DL (ref 8–23)
CALCIUM SERPL-MCNC: 8.4 MG/DL (ref 8.8–10.4)
CHLORIDE SERPL-SCNC: 102 MMOL/L (ref 98–107)
CREAT SERPL-MCNC: 0.89 MG/DL (ref 0.67–1.17)
EGFRCR SERPLBLD CKD-EPI 2021: >90 ML/MIN/1.73M2
ERYTHROCYTE [DISTWIDTH] IN BLOOD BY AUTOMATED COUNT: 16.3 % (ref 10–15)
GLUCOSE BLDC GLUCOMTR-MCNC: 123 MG/DL (ref 70–99)
GLUCOSE BLDC GLUCOMTR-MCNC: 133 MG/DL (ref 70–99)
GLUCOSE BLDC GLUCOMTR-MCNC: 141 MG/DL (ref 70–99)
GLUCOSE BLDC GLUCOMTR-MCNC: 142 MG/DL (ref 70–99)
GLUCOSE SERPL-MCNC: 135 MG/DL (ref 70–99)
HCO3 SERPL-SCNC: 24 MMOL/L (ref 22–29)
HCT VFR BLD AUTO: 33.3 % (ref 40–53)
HGB BLD-MCNC: 10.1 G/DL (ref 13.3–17.7)
INR PPP: 1.23 (ref 0.85–1.15)
MAGNESIUM SERPL-MCNC: 2.3 MG/DL (ref 1.7–2.3)
MCH RBC QN AUTO: 24.4 PG (ref 26.5–33)
MCHC RBC AUTO-ENTMCNC: 30.3 G/DL (ref 31.5–36.5)
MCV RBC AUTO: 80 FL (ref 78–100)
PHOSPHATE SERPL-MCNC: 3.6 MG/DL (ref 2.5–4.5)
PLATELET # BLD AUTO: 170 10E3/UL (ref 150–450)
POTASSIUM SERPL-SCNC: 4.8 MMOL/L (ref 3.4–5.3)
PREALB SERPL-MCNC: 7.6 MG/DL (ref 20–40)
PROT SERPL-MCNC: 7.4 G/DL (ref 6.4–8.3)
RBC # BLD AUTO: 4.14 10E6/UL (ref 4.4–5.9)
SODIUM SERPL-SCNC: 134 MMOL/L (ref 135–145)
WBC # BLD AUTO: 10.4 10E3/UL (ref 4–11)

## 2024-12-20 PROCEDURE — 82310 ASSAY OF CALCIUM: CPT | Performed by: STUDENT IN AN ORGANIZED HEALTH CARE EDUCATION/TRAINING PROGRAM

## 2024-12-20 PROCEDURE — 250N000009 HC RX 250

## 2024-12-20 PROCEDURE — 250N000009 HC RX 250: Performed by: TRANSPLANT SURGERY

## 2024-12-20 PROCEDURE — 250N000013 HC RX MED GY IP 250 OP 250 PS 637: Performed by: STUDENT IN AN ORGANIZED HEALTH CARE EDUCATION/TRAINING PROGRAM

## 2024-12-20 PROCEDURE — 250N000009 HC RX 250: Performed by: NURSE PRACTITIONER

## 2024-12-20 PROCEDURE — 82248 BILIRUBIN DIRECT: CPT | Performed by: TRANSPLANT SURGERY

## 2024-12-20 PROCEDURE — 84100 ASSAY OF PHOSPHORUS: CPT | Performed by: TRANSPLANT SURGERY

## 2024-12-20 PROCEDURE — 250N000011 HC RX IP 250 OP 636: Performed by: TRANSPLANT SURGERY

## 2024-12-20 PROCEDURE — 85014 HEMATOCRIT: CPT | Performed by: STUDENT IN AN ORGANIZED HEALTH CARE EDUCATION/TRAINING PROGRAM

## 2024-12-20 PROCEDURE — 85610 PROTHROMBIN TIME: CPT | Performed by: STUDENT IN AN ORGANIZED HEALTH CARE EDUCATION/TRAINING PROGRAM

## 2024-12-20 PROCEDURE — 83735 ASSAY OF MAGNESIUM: CPT | Performed by: TRANSPLANT SURGERY

## 2024-12-20 PROCEDURE — 84134 ASSAY OF PREALBUMIN: CPT | Performed by: TRANSPLANT SURGERY

## 2024-12-20 PROCEDURE — 250N000011 HC RX IP 250 OP 636: Performed by: PHYSICIAN ASSISTANT

## 2024-12-20 PROCEDURE — 250N000013 HC RX MED GY IP 250 OP 250 PS 637: Performed by: PHYSICIAN ASSISTANT

## 2024-12-20 PROCEDURE — 99223 1ST HOSP IP/OBS HIGH 75: CPT | Performed by: INTERNAL MEDICINE

## 2024-12-20 PROCEDURE — B4185 PARENTERAL SOL 10 GM LIPIDS: HCPCS | Performed by: TRANSPLANT SURGERY

## 2024-12-20 PROCEDURE — 36415 COLL VENOUS BLD VENIPUNCTURE: CPT | Performed by: TRANSPLANT SURGERY

## 2024-12-20 PROCEDURE — 120N000011 HC R&B TRANSPLANT UMMC

## 2024-12-20 PROCEDURE — 999N000128 HC STATISTIC PERIPHERAL IV START W/O US GUIDANCE

## 2024-12-20 RX ORDER — FLUCONAZOLE 2 MG/ML
400 INJECTION, SOLUTION INTRAVENOUS EVERY 24 HOURS
Status: DISCONTINUED | OUTPATIENT
Start: 2024-12-20 | End: 2024-12-22

## 2024-12-20 RX ORDER — LACTULOSE 10 G/15ML
15 SOLUTION ORAL DAILY
Status: CANCELLED | OUTPATIENT
Start: 2024-12-20

## 2024-12-20 RX ORDER — ALLOPURINOL 300 MG/1
300 TABLET ORAL
Status: CANCELLED | OUTPATIENT
Start: 2024-12-23

## 2024-12-20 RX ORDER — FUROSEMIDE 20 MG/1
40 TABLET ORAL EVERY MORNING
Status: CANCELLED | OUTPATIENT
Start: 2024-12-20

## 2024-12-20 RX ORDER — LIDOCAINE HYDROCHLORIDE 20 MG/ML
5 SOLUTION OROPHARYNGEAL
Status: DISCONTINUED | OUTPATIENT
Start: 2024-12-20 | End: 2024-12-23

## 2024-12-20 RX ORDER — HYDROCORTISONE 10 MG/ML
LOTION TOPICAL DAILY PRN
COMMUNITY

## 2024-12-20 RX ORDER — POLYETHYLENE GLYCOL 3350 17 G/17G
0.5 POWDER, FOR SOLUTION ORAL DAILY PRN
COMMUNITY

## 2024-12-20 RX ORDER — TAMSULOSIN HYDROCHLORIDE 0.4 MG/1
0.4 CAPSULE ORAL DAILY
Status: DISCONTINUED | OUTPATIENT
Start: 2024-12-21 | End: 2024-12-20

## 2024-12-20 RX ORDER — HEPARIN SODIUM 5000 [USP'U]/.5ML
5000 INJECTION, SOLUTION INTRAVENOUS; SUBCUTANEOUS EVERY 12 HOURS
Status: DISCONTINUED | OUTPATIENT
Start: 2024-12-20 | End: 2024-12-24 | Stop reason: HOSPADM

## 2024-12-20 RX ORDER — FERROUS SULFATE 324(65)MG
324 TABLET, DELAYED RELEASE (ENTERIC COATED) ORAL DAILY
COMMUNITY

## 2024-12-20 RX ORDER — AMILORIDE HYDROCHLORIDE 5 MG/1
5 TABLET ORAL EVERY MORNING
Status: CANCELLED | OUTPATIENT
Start: 2024-12-20

## 2024-12-20 RX ORDER — TAMSULOSIN HYDROCHLORIDE 0.4 MG/1
0.4 CAPSULE ORAL DAILY
Status: DISCONTINUED | OUTPATIENT
Start: 2024-12-20 | End: 2024-12-22

## 2024-12-20 RX ADMIN — OXYCODONE HYDROCHLORIDE 5 MG: 5 TABLET ORAL at 05:13

## 2024-12-20 RX ADMIN — RIFAXIMIN 550 MG: 550 TABLET ORAL at 19:46

## 2024-12-20 RX ADMIN — Medication 1500 MG: at 05:23

## 2024-12-20 RX ADMIN — MAGNESIUM SULFATE HEPTAHYDRATE: 500 INJECTION, SOLUTION INTRAMUSCULAR; INTRAVENOUS at 20:33

## 2024-12-20 RX ADMIN — PANTOPRAZOLE SODIUM 40 MG: 40 INJECTION, POWDER, FOR SOLUTION INTRAVENOUS at 19:46

## 2024-12-20 RX ADMIN — LIDOCAINE HYDROCHLORIDE 5 ML: 20 SOLUTION ORAL at 06:41

## 2024-12-20 RX ADMIN — FLUCONAZOLE 400 MG: 2 INJECTION, SOLUTION INTRAVENOUS at 11:34

## 2024-12-20 RX ADMIN — ACETAMINOPHEN 975 MG: 325 TABLET, FILM COATED ORAL at 09:05

## 2024-12-20 RX ADMIN — ACETAMINOPHEN 975 MG: 325 TABLET, FILM COATED ORAL at 00:31

## 2024-12-20 RX ADMIN — PIPERACILLIN SODIUM AND TAZOBACTAM SODIUM 3.38 G: 3; .375 INJECTION, SOLUTION INTRAVENOUS at 19:46

## 2024-12-20 RX ADMIN — PIPERACILLIN SODIUM AND TAZOBACTAM SODIUM 3.38 G: 3; .375 INJECTION, SOLUTION INTRAVENOUS at 01:17

## 2024-12-20 RX ADMIN — OLIVE OIL AND SOYBEAN OIL 250 ML: 16; 4 INJECTION, EMULSION INTRAVENOUS at 19:46

## 2024-12-20 RX ADMIN — PANTOPRAZOLE SODIUM 40 MG: 40 INJECTION, POWDER, FOR SOLUTION INTRAVENOUS at 01:17

## 2024-12-20 RX ADMIN — BENZOCAINE 1 ML: 220 SPRAY, METERED PERIODONTAL at 05:00

## 2024-12-20 RX ADMIN — SENNOSIDES AND DOCUSATE SODIUM 1 TABLET: 50; 8.6 TABLET ORAL at 09:55

## 2024-12-20 RX ADMIN — POLYETHYLENE GLYCOL 3350 17 G: 17 POWDER, FOR SOLUTION ORAL at 09:53

## 2024-12-20 RX ADMIN — SENNOSIDES AND DOCUSATE SODIUM 1 TABLET: 50; 8.6 TABLET ORAL at 19:46

## 2024-12-20 RX ADMIN — ACETAMINOPHEN 975 MG: 325 TABLET, FILM COATED ORAL at 17:03

## 2024-12-20 RX ADMIN — HEPARIN SODIUM 5000 UNITS: 5000 INJECTION, SOLUTION INTRAVENOUS; SUBCUTANEOUS at 17:03

## 2024-12-20 RX ADMIN — OXYCODONE HYDROCHLORIDE 2.5 MG: 5 TABLET ORAL at 00:31

## 2024-12-20 RX ADMIN — PIPERACILLIN SODIUM AND TAZOBACTAM SODIUM 3.38 G: 3; .375 INJECTION, SOLUTION INTRAVENOUS at 10:05

## 2024-12-20 RX ADMIN — PIPERACILLIN SODIUM AND TAZOBACTAM SODIUM 3.38 G: 3; .375 INJECTION, SOLUTION INTRAVENOUS at 15:06

## 2024-12-20 RX ADMIN — LIDOCAINE HYDROCHLORIDE 5 ML: 20 SOLUTION ORAL at 09:04

## 2024-12-20 RX ADMIN — BENZOCAINE 1 ML: 220 SPRAY, METERED PERIODONTAL at 00:31

## 2024-12-20 RX ADMIN — TAMSULOSIN HYDROCHLORIDE 0.4 MG: 0.4 CAPSULE ORAL at 14:00

## 2024-12-20 ASSESSMENT — ACTIVITIES OF DAILY LIVING (ADL)
ADLS_ACUITY_SCORE: 31
ADLS_ACUITY_SCORE: 23
ADLS_ACUITY_SCORE: 31
ADLS_ACUITY_SCORE: 23
ADLS_ACUITY_SCORE: 31
ADLS_ACUITY_SCORE: 33
ADLS_ACUITY_SCORE: 35
ADLS_ACUITY_SCORE: 35
ADLS_ACUITY_SCORE: 33
ADLS_ACUITY_SCORE: 33
ADLS_ACUITY_SCORE: 35
ADLS_ACUITY_SCORE: 33
ADLS_ACUITY_SCORE: 31
ADLS_ACUITY_SCORE: 31
ADLS_ACUITY_SCORE: 33
ADLS_ACUITY_SCORE: 31
ADLS_ACUITY_SCORE: 33
ADLS_ACUITY_SCORE: 33
ADLS_ACUITY_SCORE: 31
ADLS_ACUITY_SCORE: 31
ADLS_ACUITY_SCORE: 35

## 2024-12-20 NOTE — PLAN OF CARE
Arrived from PACU at 1830  VS: stable  Neuro: A&O  Mobility:  not out of bed yet   Discomfort: endorses throat pain, Resident ordered Hurricaine spray   LDAs: PIV intact with LR @ 50mL/hr   : blount intact  GI: NG intact with green output   Plan:   supportive wife at bedside

## 2024-12-20 NOTE — PROGRESS NOTES
Paged by RN at 00:48 regarding coffee ground output in NG tube canister. On exam, NG tube suction off following administration of medication. Rust-colored collection in canister and along suction tubing. Patient denies abdominal pain, dizziness, or palpitations. IV pantoprazole ordered for gastric protection while NG tube in place.    Sam Lewis MD, PGY-1  General Surgery Resident

## 2024-12-20 NOTE — PROGRESS NOTES
Transplant Surgery  Inpatient Daily Progress Note  12/20/2024    Assessment & Plan: 74 year old male with history of left inguinal hernia. Past medical history of interstitial ling disease, recurrent PE on warfarin, alcohol associated cirrhosis, portal hypertension s/p TIPS, and gout. Patient is status post left inguinal hernia and repair of sigmoid colon enterotomy secondary to left inguinal hernia, sliding with chronically incarcerated sigmoid colon, with a small abscess on 12/19/24. Admitted for monitoring, PIV nutrition, and follow up gastrograffin study on 12/23.      S/p left inguinal hernia repair complicated by colon enteromy s/p repair: POD 1   SEGUNDO serosanguinous output. Fluid culture in process. Continue antibiotics. Monitor for infection. CLD only. PIV nutrition. Plan for CT scan with IV contrast on Monday per surgeon.     GI/Nutrition:   Diet: CLD. PPN  Cirrhosis, portal hypertension with hx TIPS: MELD Na 8.  PTA amiloride, lasix, lactulose, rifaximin. Restart rifaximin, amiloride, and lasix. Hold start lactulose. .     Cardiorespiratory: stable    Hematology: stable  Hx PE: PTA on warfarin, prior to surgery lovenox to bridge. Consult Hematology to evaluate if patient appropriate to start on DOAC for prevention recurrent PE.   -Start heparin 5K subcutaneous q12 hr today per surgeon. Monitor for bleeding.     Access: PIV    Fluid/Electrolytes: PPN 83 ml/hr. CLD ordered. Replete electrolytes PRN    Endocrine: No issues    Rheum:  Gout: PTA allopurinol. Continue allopurinol    : Remove blount today. Monitor for urinary retention.  Flomax started to prevent urinary retention per surgeon.     Infectious disease: afebrile. WBC WNL. SEGUNDO serosanguinous output.   -Continue surgical ppx with Zosyn, Vanco, and diflucan due to colon enterotomy. Will clarify with staff stop date for ppx.   -12/19 abd fluid culture in process    Prophylaxis: DVT/PE: heparin SQ. No lovenox today due to concern for bleeding. GI -  PPI    Activity: Up ad kailyn. Ambulate QID. PT consult    Disposition: 7A. Plan for discharge early next week    NAA/Fellow/Resident Provider: Hilary Werner PA-C, [facility]    Faculty: Russ Dominguez M.D.  __________________________________________________________________  Transplant History: Admitted 12/19/2024 for monitoring s/p colon enterotomy.  N/A, Postoperative day:      Interval History: History is obtained from the patient  Overnight events: No c/o pain hernia repair sitght    ROS:   A 10-point review of systems was negative except as noted above.    Meds:  Current Facility-Administered Medications   Medication Dose Route Frequency Provider Last Rate Last Admin    acetaminophen (TYLENOL) tablet 975 mg  975 mg Oral Q8H Rohan Mackey MD   975 mg at 12/20/24 0905    [Held by provider] doxazosin (CARDURA) suspension 1 mg  1 mg Oral or NG Tube Daily Hilary Werner PA-C        fluconazole (DIFLUCAN) intermittent infusion 400 mg  400 mg Intravenous Q24H Russ Dominguez MD        lipids plant base (CLINOLIPID) 20 % infusion 250 mL  250 mL Intravenous Q24H Russ Dominguez MD 20.8 mL/hr at 12/19/24 2109 250 mL at 12/19/24 2109    pantoprazole (PROTONIX) IV push injection 40 mg  40 mg Intravenous QPM Sam Lewis MD   40 mg at 12/20/24 0117    piperacillin-tazobactam (ZOSYN) intermittent infusion 3.375 g  3.375 g Intravenous Q6H Russ Dominguez  mL/hr at 12/20/24 1005 3.375 g at 12/20/24 1005    polyethylene glycol (MIRALAX) Packet 17 g  17 g Oral Daily Rohan Mackey MD   17 g at 12/20/24 0953    senna-docusate (SENOKOT-S/PERICOLACE) 8.6-50 MG per tablet 1 tablet  1 tablet Oral BID Rohan Mackey MD   1 tablet at 12/20/24 0955    sodium chloride (PF) 0.9% PF flush 3 mL  3 mL Intracatheter Q8H Rohan Mackey MD        [START ON 12/21/2024] tamsulosin (FLOMAX) capsule 0.4 mg  0.4 mg Oral Daily Hilary Werner PA-C        vancomycin (VANCOCIN) 1,500 mg in 0.9% NaCl 250 mL  "intermittent infusion  1,500 mg Intravenous Q24H Russ Dominguez .7 mL/hr at 12/20/24 0523 1,500 mg at 12/20/24 0523       Physical Exam:     Admit Weight: 60.1 kg (132 lb 7.9 oz)    Current vitals:   /58   Pulse 68   Temp 98  F (36.7  C) (Oral)   Resp 18   Ht 1.702 m (5' 7\")   Wt 60.1 kg (132 lb 7.9 oz)   SpO2 96%   BMI 20.75 kg/m           Vital sign ranges:    Temp:  [97.1  F (36.2  C)-98.1  F (36.7  C)] 98  F (36.7  C)  Pulse:  [64-75] 68  Resp:  [13-20] 18  BP: (102-122)/(55-65) 113/58  SpO2:  [93 %-99 %] 96 %  Patient Vitals for the past 24 hrs:   BP Temp Temp src Pulse Resp SpO2   12/20/24 1000 113/58 -- -- -- -- 96 %   12/20/24 0529 111/55 98  F (36.7  C) Oral 68 18 95 %   12/20/24 0114 106/57 97.8  F (36.6  C) Oral -- 17 96 %   12/19/24 2159 115/57 97.7  F (36.5  C) Oral -- 17 --   12/19/24 1839 106/55 98.1  F (36.7  C) Oral 75 19 96 %   12/19/24 1800 109/60 -- -- 70 17 99 %   12/19/24 1730 122/63 97.2  F (36.2  C) -- 74 20 98 %   12/19/24 1700 110/61 97.1  F (36.2  C) -- 71 13 99 %   12/19/24 1630 105/61 97.3  F (36.3  C) -- 71 16 98 %   12/19/24 1620 -- -- -- -- -- 98 %   12/19/24 1615 102/60 97.5  F (36.4  C) -- 68 16 98 %   12/19/24 1600 116/63 97.2  F (36.2  C) -- 68 17 98 %   12/19/24 1545 111/61 97.1  F (36.2  C) -- 65 14 98 %   12/19/24 1530 111/65 97.2  F (36.2  C) -- 64 14 98 %   12/19/24 1515 112/57 97.3  F (36.3  C) -- 69 17 97 %   12/19/24 1500 111/56 97.8  F (36.6  C) Skin 74 19 93 %     General Appearance: in no apparent distress.   Skin: normal, warm  Heart: well perfused  Lungs: Nonlabored resps on RA  Abdomen: The abdomen is soft, nontender  : left groin incision c/d/I. SEGUNDO serosanguinous output. blount is present.   Extremities: edema: absent.   Neurologic: awake, alert and oriented.       Data:   CMP  Recent Labs   Lab 12/20/24  0630 12/20/24  0534 12/19/24  1636   *  --  135  135   POTASSIUM 4.8  --  4.4  4.4   CHLORIDE 102  --  102  102   CO2 24  --  " 21*  21*   * 141* 161*  161*   BUN 18.2  --  16.6  16.6   CR 0.89  --  0.94  0.94   GFRESTIMATED >90  --  86  86   CLARIBEL 8.4*  --  8.3*  8.3*   MAG 2.3  --  2.1   PHOS 3.6  --  3.7   ALBUMIN 3.2*  --  3.3*   BILITOTAL 0.9  --  1.1   ALKPHOS 77  --  82   AST 24  --  26   ALT <5  --  <5     CBC  Recent Labs   Lab 12/20/24  0630 12/19/24  1636   HGB 10.1* 9.9*   WBC 10.4 9.4    157     COAGS  Recent Labs   Lab 12/20/24  0630 12/19/24  1636   INR 1.23* 1.28*      Urinalysis  No lab results found.

## 2024-12-20 NOTE — CONSULTS
Hematology Consult Note   Date of Service: 12/20/2024    Patient: Les Morel  MRN: [id number]  Admission Date: 12/19/2024  Hospital Day # Hospital Day: 2  Primary Outpatient Hematologist: None    Reason for Consult: Request to evaluate patient for possible DOAC treatment for PE. Hx of failure on DOAC but patient reports there was an issue of incorrect dose     History of Present Illness:    Les Morel is a 74 year old male with reported history of recurrent pulmonary embolisms, interstitial lung disease, alcohol associated cirrhosis, portal hypertension status post TIPS and gout.  He underwent left inguinal hernia and chronically incarcerated sigmoid colon repair with enterotomy, developed a small abscess on 12/19/2024 and admitted for monitoring. Hematology is consulted for recommendations regarding DOAC.    Patient would like to go back to apixaban if possible but he can only afford the 2.5 mg BID dose (this costs around 1k per month for him) as opposed to 5 mg twice daily (this would cost 2k per month).    Of note, pertinent labs today, for his cirrhosis-Child Thompson class B MELD Na 8     Hematologic History:  Per anticoagulation clinic note (12/10/2024).  Warfarin was started on 9/24/2024 due to concern for apixaban failure indefinitely for recurrent pulmonary embolisms.  He had previously been on warfarin nearly 10 years ago.    CT angiogram chests  2013: Initial pulmonary embolism, was started on warfarin   2015: Warfarin switched to apixaban 2.5 mg twice daily  4/21/2017: Nonocclusive pulmonary embolism in the right upper lobe artery and sequela of old pulmonary embolism in the right upper lobe--this may have been in the setting of missing apixaban doses, hence it was continued/resumed at the same dose  9/6/2024: He was on apixaban 2.5 BID at this time, had skipped about 5 doses prior to paracentesis.  Had a ED visit-pleuritic left upper back pain, CTA showed Acute left upper lobe occlusive pulmonary  embolism in subsegmental artery. His dose of apixaban was increased from 10 mg twice daily for 7 days and 5 mg twice daily after that which he reports to have been compliant with.  9/18/2024: Left upper lobe segmental pulmonary embolism.  Subsegmental PE described on 9/6 resolved.  Started on Lovenox.  9/24/2024: Started on warfarin.    Review of Systems: Pertinent positive and negative systems described in HPI; the remainder of the 14 systems are negative    Past Medical History:  Past Medical History:   Diagnosis Date    Alcoholic cirrhosis of liver without ascites (H) 10/28/2024    Gout     Hernia, inguinal 10/28/2024    ILD (interstitial lung disease) (H)     Portal hypertension (H)     Recurrent pulmonary embolism (H)     S/P TIPS (transjugular intrahepatic portosystemic shunt)        Past Surgical History:  Past Surgical History:   Procedure Laterality Date    CHOLECYSTECTOMY, OPEN      HERNIA REPAIR      age 7    HERNIA REPAIR      recurrent ventral hernia    TIPS PROCEDURE      x3, last 5/2024       Social History:  Social History     Socioeconomic History    Marital status:      Spouse name: None    Number of children: None    Years of education: None    Highest education level: None   Tobacco Use    Smoking status: Never     Passive exposure: Never    Smokeless tobacco: Never   Substance and Sexual Activity    Alcohol use: Not Currently     Comment: Sober March 2023    Drug use: Never     Social Drivers of Health     Financial Resource Strain: Low Risk  (4/28/2023)    Received from [facility]    Overall Financial Resource Strain (CARDIA)     Difficulty of Paying Living Expenses: Not hard at all   Food Insecurity: No Food Insecurity (9/29/2024)    Received from [facility] and [facility]    Hunger Vital Sign     Worried About Running Out of Food in the Last Year: Never true     Ran Out of Food in the Last Year: Never true   Transportation Needs:  No Transportation Needs (9/29/2024)    [facility]    PRAPARE - Transportation     Lack of Transportation (Medical): No     Lack of Transportation (Non-Medical): No   Physical Activity: Inactive (4/29/2024)    [facility]    Exercise Vital Sign     Days of Exercise per Week: 0 days     Minutes of Exercise per Session: 0 min   Stress: No Stress Concern Present (4/29/2024)    [facility]    [employer] - Occupational Stress Questionnaire     Feeling of Stress : Not at all   Social Connections: Moderately Isolated (4/29/2024)    [facility]    Social Connection and Isolation Panel [NHANES]     Frequency of Communication with Friends and Family: Twice a week     Frequency of Social Gatherings with Friends and Family: Once a week     Attends Anglican Services: Never     Active Member of Clubs or Organizations: No     Attends Club or Organization Meetings: Never     Marital Status:    Interpersonal Safety: Low Risk  (12/19/2024)    Interpersonal Safety     Do you feel physically and emotionally safe where you currently live?: Yes     Within the past 12 months, have you been hit, slapped, kicked or otherwise physically hurt by someone?: No     Within the past 12 months, have you been humiliated or emotionally abused in other ways by your partner or ex-partner?: No   Housing Stability: Low Risk  (9/29/2024)    [facility]    Housing Stability Vital Sign     Unable to Pay for Housing in the Last Year: No     Number of Times Moved in the Last Year: 0     Homeless in the Last Year: No        Family History  Family History   Problem Relation Age of Onset    Anesthesia Reaction No family hx of     Venous thrombosis No family hx of        Outpatient  "Medications:  Current Facility-Administered Medications   Medication Dose Route Frequency Provider Last Rate Last Admin    acetaminophen (TYLENOL) tablet 975 mg  975 mg Oral Q8H Rohan Mackey MD   975 mg at 12/20/24 0905    [START ON 12/22/2024] bisacodyl (DULCOLAX) suppository 10 mg  10 mg Rectal Daily PRN Rohan Mackey MD        BUPivacaine liposome (EXPAREL) LA inj was given in the infiltration site to produce post-op analgesia. Duration of action is up to 72 hours. Other \"yolette\" meds should not be given for 96 hours except for lidocaine 4% patch. This is for INFORMATION ONLY.   Does not apply Continuous PRN Carol Joshi DO        dextrose 10% infusion   Intravenous Continuous PRN Russ Dominguez MD        diphenhydrAMINE (BENADRYL) elixir 12.5 mg  12.5 mg Oral Q6H PRN Rohan Mackey MD        Or    diphenhydrAMINE (BENADRYL) injection 12.5 mg  12.5 mg Intravenous Q6H PRN Rohan Mackey MD        fluconazole (DIFLUCAN) intermittent infusion 400 mg  400 mg Intravenous Q24H Russ Dominguez  mL/hr at 12/20/24 1134 400 mg at 12/20/24 1134    HYDROmorphone (DILAUDID) injection 0.1 mg  0.1 mg Intravenous Q4H PRN Rohan Mackey MD        Or    HYDROmorphone (DILAUDID) injection 0.2 mg  0.2 mg Intravenous Q4H PRN Rohan Mackey MD        lidocaine (LMX4) cream   Topical Q1H PRN Rohan Mackey MD        lidocaine (viscous) (XYLOCAINE) 2 % solution 5 mL  5 mL Mouth/Throat Q2H PRN Jacquie Villa APRN CNP   5 mL at 12/20/24 0904    lidocaine 1 % 0.1-1 mL  0.1-1 mL Other Q1H PRN Rohan Mackey MD        lipids plant base (CLINOLIPID) 20 % infusion 250 mL  250 mL Intravenous Q24H Russ Dominguez MD 20.8 mL/hr at 12/19/24 2109 250 mL at 12/19/24 2109    [START ON 12/21/2024] magnesium hydroxide (MILK OF MAGNESIA) suspension 30 mL  30 mL Oral Daily PRN Rohan Mackey MD        methocarbamol (ROBAXIN) half-tab 250 mg  250 mg Oral Q6H PRN Rohan Mackey MD        naloxone (NARCAN) " injection 0.2 mg  0.2 mg Intravenous Q2 Min PRN Russ Dominguez MD        Or    naloxone (NARCAN) injection 0.4 mg  0.4 mg Intravenous Q2 Min PRN Russ Dominguez MD        Or    naloxone (NARCAN) injection 0.2 mg  0.2 mg Intramuscular Q2 Min PRN Russ Dominguez MD        Or    naloxone (NARCAN) injection 0.4 mg  0.4 mg Intramuscular Q2 Min PRN Russ Dominguez MD        ondansetron (ZOFRAN ODT) ODT tab 4 mg  4 mg Oral Q6H PRN Rohan Mackey MD        Or    ondansetron (ZOFRAN) injection 4 mg  4 mg Intravenous Q6H PRN Rohan Mackey MD        oxyCODONE IR (ROXICODONE) half-tab 2.5 mg  2.5 mg Oral Q4H PRN Rohan Mackey MD   2.5 mg at 12/20/24 0031    Or    oxyCODONE (ROXICODONE) tablet 5 mg  5 mg Oral Q4H PRN Rohan Mackey MD   5 mg at 12/20/24 0513    pantoprazole (PROTONIX) IV push injection 40 mg  40 mg Intravenous QPM Sam Lewis MD   40 mg at 12/20/24 0117    parenteral nutrition - Clinimix E   PERIPHERAL LINE IV TPN CONTINUOUS Russ Dominguez MD 83 mL/hr at 12/19/24 2109 New Bag at 12/19/24 2109    phenol (CHLORASEPTIC) 1.4 % spray 1 mL  1 spray Mouth/Throat Q1H PRN Jacquie Villa APRN CNP        piperacillin-tazobactam (ZOSYN) intermittent infusion 3.375 g  3.375 g Intravenous Q6H Russ Dominguez  mL/hr at 12/20/24 1005 3.375 g at 12/20/24 1005    polyethylene glycol (MIRALAX) Packet 17 g  17 g Oral Daily Rohan Mackey MD   17 g at 12/20/24 0953    prochlorperazine (COMPAZINE) injection 5 mg  5 mg Intravenous Q6H PRN Rohan Mackey MD        Or    prochlorperazine (COMPAZINE) tablet 5 mg  5 mg Oral Q6H PRN Rohan Mackey MD        rifaximin (XIFAXAN) tablet 550 mg  550 mg Oral Daily Hilary Werner PA-C        senna-docusate (SENOKOT-S/PERICOLACE) 8.6-50 MG per tablet 1 tablet  1 tablet Oral BID Rohan Mackey MD   1 tablet at 12/20/24 0955    sodium chloride (PF) 0.9% PF flush 3 mL  3 mL Intracatheter Q8H Rohan Mackey MD        sodium  "chloride (PF) 0.9% PF flush 3 mL  3 mL Intracatheter q1 min Rohan Hernandez MD        [START ON 12/21/2024] tamsulosin (FLOMAX) capsule 0.4 mg  0.4 mg Oral Daily Hilary Werner PA-C        vancomycin (VANCOCIN) 1,500 mg in 0.9% NaCl 250 mL intermittent infusion  1,500 mg Intravenous Q24H Russ Dominguez .7 mL/hr at 12/20/24 0523 1,500 mg at 12/20/24 0523        Physical Exam:    /58   Pulse 68   Temp 98  F (36.7  C) (Oral)   Resp 18   Ht 1.702 m (5' 7\")   Wt 60.1 kg (132 lb 7.9 oz)   SpO2 96%   BMI 20.75 kg/m    Gen: Well appearing, in no acute distress  HEENT: No pallor or icterus  CV: Normal rate  Pulm: Normal work of breathing, no adventitious breath sounds  Abd/: Groin incision not visualized  Ext: Warm and well perfused. No lower extremity edema or redness  Skin: No rash, petechiae or purpura  Neuro: No gross deficits    Labs & Studies:   CMP  Recent Labs   Lab 12/20/24  1147 12/20/24  0630 12/20/24  0534 12/19/24  1636   NA  --  134*  --  135  135   POTASSIUM  --  4.8  --  4.4  4.4   CHLORIDE  --  102  --  102  102   CO2  --  24  --  21*  21*   ANIONGAP  --  8  --  12  12   * 135* 141* 161*  161*   BUN  --  18.2  --  16.6  16.6   CR  --  0.89  --  0.94  0.94   GFRESTIMATED  --  >90  --  86  86   CLARIBEL  --  8.4*  --  8.3*  8.3*   MAG  --  2.3  --  2.1   PHOS  --  3.6  --  3.7   PROTTOTAL  --  7.4  --  7.4   ALBUMIN  --  3.2*  --  3.3*   BILITOTAL  --  0.9  --  1.1   ALKPHOS  --  77  --  82   AST  --  24  --  26   ALT  --  <5  --  <5     CBC  Recent Labs   Lab 12/20/24  0630 12/19/24  1636   WBC 10.4 9.4   RBC 4.14* 4.03*   HGB 10.1* 9.9*   HCT 33.3* 31.8*   MCV 80 79   MCH 24.4* 24.6*   MCHC 30.3* 31.1*   RDW 16.3* 16.2*    157     INR  Recent Labs   Lab 12/20/24  0630 12/19/24  1636   INR 1.23* 1.28*         Assessment & Plan:   Les Morel is a 74 year old male with recurrent pulmonary embolisms, interstitial lung disease, alcohol associated " cirrhosis (associated portal hypertension, TIPS) and gout who is admitted for monitoring after left inguinal hernia and incarcerated sigmoid colon repair on 12/19/2024.  Hematology is consulted regarding recommendations for DOAC.    Although the first breakthrough clot on 9/6/2024 is explained by interruptions in his prophylactic apixaban, the new clot on the CTA on 9/18/2024 while recently having received loading dose apixaban followed by treatment dose of 5 mg twice daily is concerning for apixaban failure.  In addition, per the FDA, in patient with Child-Thompson class B liver cirrhosis, it is recommended to use apixaban with caution; no dose adjustment can be recommended (as this population has largely been excluded from the studies). Overall warfarin is a safer option. Having said that we do acknowledge that it may be technically challenging if he requires frequent paracenteses.  In that situation, he can consider risk versus benefit discussion with his outpatient anticoagulation management team regarding switching to apixaban. Apixaban treatment dose is cost prohibitive for him as well. He would not want to do Lovenox or fondaparinux injections on long-term basis.  After considering all these factors, he will stay on warfarin for now.    # Recurrent pulmonary embolisms  # Liver cirrhosis    Recommendations:   -Continue warfarin  -If patient feels strongly about trying apixaban again and is willing to take the risk of potential failure given the grey area, this can be considered but he will need regular follow-up for a nuanced discussion (defer this to outpatient setting)    Discussed with Dr. Duff    We will sign off. Please call with questions.    Luly Knowles MD MS  Hematology fellow, PGY5  Pager: [phone]    Physician Attestation   I, Jb Duff MD, saw this patient with the resident/fellow and agree with their findings and plan of care as documented in the note.  Has developed new clots several  times on apixaban even once not long after a 10mg BID load. Thus we are recommending warfarin. Fondaparinux or enoxaparin are also options given the need for paracentesis every few weeks, but he does not want to do an injection. He also has not had insurance coverage for apixaban dose greater than 2.5mg BID, which has been shown to be inadequate for him.    On the date of service, 12/20/2024, I spent 80 minutes on the patient unit personally reviewing medical records and medications, reviewing vital signs, labs, and imaging results as summarized above, discussing the patient's case on rounds with the fellow and NAA, obtaining a history from the patient, performing a physical exam, counseling and educating the patient on the diagnosis and treatment, evaluating a potentially life or organ threatening problem, intensively monitoring treatments with high risk of toxicity, coordinating care, and documenting in the electronic medical record.    Thank you for allowing me to participate in the care of this patient. Please do not hesitate to contact me if there are any concerns or questions.     Jb Duff MD   of Medicine  Classical Hematology and Blood and Marrow Transplantation  Division of Hematology, Oncology, and Transplantation  [facility]

## 2024-12-20 NOTE — PLAN OF CARE
"Goal Outcome Evaluation:    /55 (BP Location: Left arm)   Pulse 68   Temp 98  F (36.7  C) (Oral)   Resp 18   Ht 1.702 m (5' 7\")   Wt 60.1 kg (132 lb 7.9 oz)   SpO2 95%   BMI 20.75 kg/m      Shift: [id number]    VS: VSS  Neuro: Aox4  Cardio: WDL   Respiratory: WDL on RA  GI: passing gas, no BM, normoactive BS  : Vo, adequate output  Skin: abd incision covered with post op dressing  Diet: NPO excepts meds  BG: none  LDA: R PIV, NG LIS, Vo  Infusions: abx, TPN and lipids  Mobility: Not OOB  Pain/Nausea: c/o of constant throat pain  PRN medications: hurricane spray, oxy x1, Tylenol x2, oxy x1      "

## 2024-12-20 NOTE — PROGRESS NOTES
CLINICAL NUTRITION SERVICES - ASSESSMENT NOTE     Nutrition Prescription    RECOMMENDATIONS FOR MDs/PROVIDERS TO ORDER:   - Recommend speech therapy be consulted prior to diet advancement for consistency.    Malnutrition Status:   - Severe malnutrition in the context of chronic disease.    Recommendations already ordered by Registered Dietitian (RD):   Dosing weight:  60.1 kg  Access: Peripheral    Initial parameters (per day)  Volume:  1920 mL  Dextrose: 110 g  AA: 85 g  Lipids: 250 mL 20%, 7 days per week     Goal PN provides 110 g dextrose, 85 g AA, and 250 mL 20% lipids 7 days per week for total provision of 1214 Kcals (20 Kcals/kg), 1.4 g/kg protein, GIR 1.27 mg/kg/minute, and 41% fat kcals on average daily.      Future/Additional Recommendations:   - Monitor weight/intake trends at next follow up.        REASON FOR ASSESSMENT  Les Morel is a/an 74 year old male assessed by the dietitian for Pharmacy/Nutrition to Start and Manage PN - Enterotomy     PMHx:  Repair of sliding inguinal hernia repair with mesh. Repair of sigmoid colon enterotomy.    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 Patient is status post left inguinal hernia and repair of sigmoid colon enterotomy secondary to left inguinal hernia, sliding with chronically incarcerated sigmoid colon, with a small abscess on 12/19/24. Admitted for monitoring, PIV nutrition, and follow up gastrograffin study on 12/23.      Allergies: Tangerines - hives.    NUTRITION HISTORY  Visited w/pt: December 20, 2024  Pt and wife (Haydee) in room. Pt reports eating semi-well rounded diet (three meals/d). Breakfast is typically coffee w/some creamer and two cookies/baked bread, pt will have 1/2 or more of a quest protein drink for mid morning snack if desired. Appears pt may have financial barriers citing, drinking half a shake at a time because of the expense, and buying canned  "food. Walked pt through heart healthy cost-friendly tips. Pt reports his sweet tooth raises his glucose, but \"has everything (diagnoses) expect diabetes and cancer. *knock on wood*\" Lunch and dinner consist of a meat/fish and starch and they will have salads or green beans. Pt reports he is on warfrin (not currently) so he has restrictions. Discussed low vit K options for veg+fruit. Pt and spouse eat out occasionally and will go to a pizza buffet with soup and salad. Pt \"doctor's up\" a [employer] frozen pizza and will have that often for dinner. Discussed sodium concerns w/pt and pt reports he used to eat a jar of olives/wk but now just has them on the pizza. Pt drinks milk but only with some meals and not w/snacks or dessert because it \"takes away from his allowance for the day.\" Pt will have a sweet and a small sprite before bed. Pt endorses drinking soda, pt drinks Regular Pepsi, 1 glass/d. Pt's wife is on weightwatchers and the pt has had many past dietary restrictions so they have wanted to speak to an RD but haven't been able to.    CURRENT NUTRITION ORDERS  Diet: Clear Liquid, previously NPO   Orders Placed This Encounter:     Snacks/Supplements: N/A NPO  Intake/Tolerance: N/A - NPO since admit.    LABS   Labs Reviewed   12/19/24 12/20/24    Sodium 135 134 (L)   Potassium 4.4 4.8   Carbon Dioxide (CO2) 21 (L) 24   Urea Nitrogen 16.6 18.2   Creatinine 0.94 0.89   GFR Estimate 86 >90   Calcium 8.3 (L) 8.4 (L)   Magnesium 2.1 2.3   Phosphorus 3.7 3.6   Albumin 3.3 (L) 3.2 (L)   Protein Total 7.4 7.4   Alkaline Phosphatase 82 77   ALT <5 <5   AST 26 24   Bilirubin Direct  0.32 (H)   Bilirubin Total 1.1 0.9   Glucose 161 (H) 135 (H)   GLUCOSE POCT  141 (H)   WBC 9.4    Hemoglobin 9.9 (L)      MEDICATIONS   Medications reviewed  - Protonix  - Clinimix E  - Clinolipid  - Vancomycin  - Miralax  - Senokot    Active PRNs:  - Chloraseptic  - Lidocaine (viscous)  - Dextrose 10% infusion  - Oxycodone  - Dilaudid  - " "Dulcolax  - Milk of Magnesia  - Compazine  - Zofran    ANTHROPOMETRICS  Height: 170.2 cm (5' 7\")  Most Recent Weight: 60.1 kg (132 lb 7.9 oz)    IBW: 67.3 kg  % IBW: 89.3% IBW  BMI: 20.75 kg/m2 - Normal BMI    Weight History:   Pt with 10.2 kg (14.5%) weight loss over 6 months.    Pt with 2.9 kg (4.6 %) weight loss over 1 month.    Wt Readings from Last 25 Encounters:   12/19/24 60.1 kg (132 lb 7.9 oz)   11/18/24 63 kg (139 lb)   11/18/24 63.1 kg (139 lb 3.2 oz)   09/23/24 63.9 kg - OP visit wt   06/07/24 70.3 kg - OP visit wt     Dosing Weight: 60.1 kg - Actual wt (12/19 - lowest wt of admit)    ASSESSED NUTRITION NEEDS  Estimated Energy Needs: 8994-1673 kcals/day (20 - 25 kcals/kg w/PPN or 25-30 kcals/day w/o PPN)  Justification: TPN and Maintenance  Estimated Protein Needs: 72-90 grams protein/day (1.2 - 1.5 grams of pro/kg)  Justification: Maintenance  Estimated Fluid Needs: 1 mL/kcal  Justification: Maintenance    PHYSICAL FINDINGS   See malnutrition section below.    LBM - Not noted as of 12/20 @0833  WOC - Not following at this time 12/20 @1454    MALNUTRITION   % Intake: No decreased intake noted  % Weight Loss: > 10% in 6 months (severe)  Subcutaneous Fat Loss: Global Severe  Muscle Loss: Global Severe  Fluid Accumulation/Edema: None noted  Malnutrition Diagnosis: Severe malnutrition in the context of chronic illness.    NUTRITION DIAGNOSIS   Inadequate oral intake related to clear liquid diet/ previous NPO due to left inguinal hernia as evidenced by need for alternative route for nutrition.      INTERVENTIONS   Implementation   Nutrition Education: No education needs assessed at this time   Collaboration with other providers - contacted team via [employer]; pt is having difficulty swallowing and chewing. Spoke to PharmD about PPN and spoke to team.    Goals   Total avg nutritional intake to meet a minimum of 20-25 kcal/kg and 1.2-1.5 g PRO/kg daily (per dosing wt 60.1 kg).     Monitoring/Evaluation   Progress " toward goals will be monitored and evaluated per protocol.    Nicole Goncalves  [facility] Dietetic Intern    I have read the above note by the dietetic intern and agree with the assessment.  Bee Samaniego MS/RD/LD/CNSC  Available on [employer]   M-F (7am-3:30pm) - 7A/7B Clinical Dietitian  Weekend/Holiday Dietitian (7am-3:30pm)    ** Clinical Dietitians no longer available on pager

## 2024-12-20 NOTE — PROGRESS NOTES
Transfer @ 1805  Transferred to: 7A from PACU  Via: stretcher  Reason for transfer: Pt appropriate for 7B   Family: Aware of transfer, with patient at bedside  Belongings: Sent with pt  Chart: Sent with pt  Report called to: Raquel JOLLEY RN

## 2024-12-20 NOTE — PROGRESS NOTES
Patient has a very good working PIV just needed to adjusted the tubing to make it comfortable . I did reposition the tubing for his IV infusion and he was ok with it

## 2024-12-20 NOTE — CONSULTS
"Social Work Consult Brief Note:    Social Work consult acknowledged. Team consulted to assess for funding to pay for the pt's family to stay in the [place] area while he is hospitalized until at least Monday. Writer met with pt's wife Priya in the hospital room. Pt and his wife reside in [place], [place] in a house, which is three hours from [facility]. Writer reviewed the pt's situation - he does not have cancer, he is not a transplant candidate, and there are no children involved. The patient is not on Medical Assistance, which would be indicative of dire financial need. This writer was unable to locate funding to cover the cost of this pt's family's hotel while he is hospitalized. Pt's wife understanding that this writer was unable to locate funding to assist her with her hotel stay. She identified no additional needs.    Care management team will sign off, please re-consult if any additional needs arise.    GWENDOLYN Hernandez, CHRYSTAL  Clinical , [facility]-7C  Desk Phone: [phone]   Schedule: Wednesday, Thursday, Fridays (for Mondays & Tuesdays, contact job share partner Shania Banda)  Securely message with Managed Objects (Search Name or 7C Med Surg [id number] SW)  Text page via Maps InDeed Paging/Directory   See signed in provider for up to date coverage information  Social Work & Care Management Department    Weekend And After Hours Care Management Contacts:  After Hours Social Work Coverage [phone] daily: Searchable in Vocera \"Adult SW After Hours On Call\"   Weekend Coverage [phone]: Searchable in Vocera \"7C Med Surg SW\" or \"7C Med Surg RNCC\"    "

## 2024-12-20 NOTE — PLAN OF CARE
"Goal Outcome Evaluation:      Plan of Care Reviewed With: patient, spouse    Overall Patient Progress: no changeOverall Patient Progress: no change     /70 (BP Location: Left arm)   Pulse 72   Temp 98.1  F (36.7  C) (Oral)   Resp 16   Ht 1.702 m (5' 7\")   Wt 60.1 kg (132 lb 7.9 oz)   SpO2 96%   BMI 20.75 kg/m      Shift: 2405-7363  VS: VSS on RA, afebrile  Neuro: Aox4  Behaviors: calm and cooperative  BG: q6: 133  Respiratory: diminished lung sounds at bases  Cardiac: WDL  Pain/Nausea: reports sore throat and slight abd pain, denies nausea   PRN: lidocaine solution   Diet: Clear liquid   IV Access: RPIV  Infusion(s): TPN @ 83 ml/hr  Lines/Drains: LJP drain   GI/: was voiding with blount, blount taken out this afternoon ,PVR x2 needed,No BM but passing gas  Skin: LLQ abd incision w primapore  Mobility: SBA W/ walker  Plan: continue plan of care          "

## 2024-12-20 NOTE — ANESTHESIA POSTPROCEDURE EVALUATION
Patient: Les Morel    Procedure: Procedure(s):  Repair of sliding  inguinal hernia repair with mesh. Repair of sigmoid colon enterotomy.       Anesthesia Type:  No value filed.    Note:  Disposition: Inpatient   Postop Pain Control: Uneventful            Sign Out: Well controlled pain   PONV: No   Neuro/Psych: Uneventful            Sign Out: Acceptable/Baseline neuro status   Airway/Respiratory: Uneventful            Sign Out: Acceptable/Baseline resp. status   CV/Hemodynamics: Uneventful            Sign Out: Acceptable CV status; No obvious hypovolemia; No obvious fluid overload   Other NRE: NONE   DID A NON-ROUTINE EVENT OCCUR?            Last vitals:  Vitals Value Taken Time   /60 12/19/24 1800   Temp 36.3  C (97.3  F) 12/19/24 1756   Pulse 70 12/19/24 1800   Resp 19 12/19/24 1800   SpO2 99 % 12/19/24 1800   Vitals shown include unfiled device data.    Electronically Signed By: Sonja Charles MD  December 19, 2024  6:16 PM

## 2024-12-20 NOTE — PROGRESS NOTES
Notified by RN of severe throat pain due to NG. Had tried Hurricaine spray and oxycodone. On exam, pt reported severe right sided throat pain w/ swallow only. Noted erythema and tiny bloody drainage around tube, which was situated on right side of pharynx partially posterior to tonsil. Will discontinue Hurricaine spray and add viscous lidocaine solution for gargling and chloraseptic spray.    Jacquie Villa NP

## 2024-12-20 NOTE — PHARMACY-ADMISSION MEDICATION HISTORY
Pharmacy Intern Admission Medication History    Admission medication history is complete. The information provided in this note is only as accurate as the sources available at the time of the update.    Information Source(s): Patient and Family member via in-person. Patient was a good historian and was accompanied by his wife, Priya.    Pertinent Information:   Patient is taking amiloride and furosemide differently than prescribed (see below for details). He takes these medications as he feels is needed. He does not like how often the medications make him need to use the restroom.  Patient is taking lactulose and rifaximin differently than prescribed (see below for details). He says he will either take rifaximin 550 mg by mouth twice daily without taking lactulose or he will take rifaximin 550 mg by mouth once daily in the morning with 15 mL lactulose.  Patient is taking potassium chloride 20 meq tablets by mouth once daily as needed. This frequency is about twice per week.  Patient takes the lowest dose of acetaminophen that is effective to control his pain. He reports that this is usually one 325 mg or one 500 mg tablet.  Patient goes to the [facility] warfarin clinic in [place].  Patient use to be prescribed apixaban but this was stopped because he was getting clots. His provider switch him to warfarin for increased monitoring. His warfarin was held after his last dose on 12/13/24 and he was bridged with enoxaparin 60 mg injections twice daily for three days prior to surgery (12/16, 12/17, 12/18). On 12/18, he only took his morning injection.  Prior to starting enoxaparin, the patient was taking warfarin 5 mg by mouth once daily on Monday and Friday and 4 mg by mouth once daily on all other days of the week.  Patient has some hydrocodone-acetaminophen and oxycodone tablets (per his dispense history) left over at home but is not taking any. He prefers to take the lowest amount of pain medication that is safe  for him to effectively control his pain.     Changes made to PTA medication list:  Added: ferrous sulfate, hydrocortisone 1% external lotion, polyethylene glycol  Deleted: None  Changed:   Patient is taking amiloride differently than prescribed. Patient is taking one 5 mg tablet by mouth 5 to 7 days per week. This is different than his prescription to take two 5 mg tablets (10 mg) by mouth every morning.  Patient is taking furosemide differently than prescribed. Patient is taking two 20 mg tablets (40 mg) by mouth 5 to 7 days per week. This is different than his prescription to take three 20 mg tablets (60 mg) by mouth every morning.  Patient is taking lactulose differently than prescribed. Patient is taking lactulose 15 mL by mouth a few times a week. This is different than  his prescription to take 15 mL by mouth once daily.  Patient is taking rifaximin differently than prescribed. Patient is taking one 550 mg tablet by mouth either once or twice daily depending on the day. This is different than his prescription to take 550 mg by mouth twice daily.    Allergies reviewed with patient and updates made in EHR: yes    Medication History Completed By: WILFREDO ALBERTS 12/20/2024 9:44 AM    PTA Med List   Medication Sig Note Last Dose/Taking    acetaminophen (TYLENOL) 500 MG tablet Take 500 mg by mouth every 6 hours as needed for pain.  12/19/2024 at  8:00 AM    allopurinol (ZYLOPRIM) 300 MG tablet Take 300 mg by mouth twice a week.  12/18/2024 at  8:00 AM    aMILoride (MIDAMOR) 5 MG tablet Take 10 mg by mouth every morning. (Patient taking differently: Take 5 mg by mouth every morning. Taking 5 mg by mouth 5 to 7 days of the week)  12/18/2024 at  8:00 AM    enoxaparin ANTICOAGULANT (LOVENOX) 60 MG/0.6ML syringe Inject 60 mg subcutaneously 2 times daily.  12/18/2024 at  8:00 AM    Ferrous Sulfate 324 (65 Fe) MG TBEC Take 324 mg by mouth daily.  Taking    furosemide (LASIX) 20 MG tablet Take 60 mg by mouth every morning.  (Patient taking differently: Take 40 mg by mouth every morning. Taking 40 mg by mouth 5 to 7 days of the week.) 12/18/2024: Hold DOS 12/17/2024 Morning    hydrocortisone (CORTIZONE 10) 1 % external lotion Apply topically daily as needed for itching.  Taking As Needed    lactulose (CHRONULAC) 10 GM/15ML solution Take 15 mLs by mouth daily. (Patient taking differently: Take 15 mLs by mouth daily. Patient takes 15 mL by mouth a few times per week.) 12/18/2024: Hold DOS 12/18/2024 at  8:00 AM    polyethylene glycol (MIRALAX) 17 GM/Dose powder Take 0.5 Capfuls by mouth daily as needed for constipation.  Taking As Needed    potassium chloride lópez ER (KLOR-CON M20) 20 MEQ CR tablet Take 20 mEq by mouth daily as needed for potassium supplementation. 12/18/2024: Hold DOS 12/16/2024    rifaximin (XIFAXAN) 550 MG TABS tablet Take 550 mg by mouth 2 times daily. (Patient taking differently: Take 550 mg by mouth 2 times daily. Taking 550 mg by mouth once or twice daily.)  12/18/2024 at  8:00 AM    warfarin ANTICOAGULANT (COUMADIN) 1 MG tablet Take by mouth every evening. 5 mg Monday and Friday  4 mg all other days of the week  12/13/2024     Darlyn VogelD. 4 Student      Although I was not present for the interview, nor did I personally see the patient, to my knowledge the intern has compiled the PTA medication list to the best of their ability given the information available at the time.  Prudence Lovett, Pharm.D., BCPS, BCTXP  Vocera 7A pharmacist

## 2024-12-20 NOTE — PROGRESS NOTES
"  Transplant Surgery Progress Note  Surgery Cross-Cover  Post Op Check    12/19/2024    Les Morel is a 74 year old male POD#0 s/p Procedure(s):  Repair of sliding  inguinal hernia repair with mesh. Repair of sigmoid colon enterotomy. for Pre-Op Diagnosis Codes:      * Hernia, inguinal [K40.90]    Pt reports their pain is controlled with current regimen. Denies nausea, SOB, chest pain, or dizziness. Patient is not passing flatus or having bowel movements and Is voiding via urinary catheter. Complaining of throat discomfort.    /55   Pulse 75   Temp 98.1  F (36.7  C) (Oral)   Resp 19   Ht 1.702 m (5' 7\")   Wt 60.1 kg (132 lb 7.9 oz)   SpO2 96%   BMI 20.75 kg/m      Gen: A&O x4, NAD   Chest: breathing non-labored on nasal cannula at 2 liters per minute   Abdomen: soft, non-tender, non-distended  Incision: clean, dry, intact covered by dressings  Extremities: warm and well perfused  Devices: SEGUNDO drain with serosanguinous output    A/P: 74-year-old male s/p sliding inguinal hernia repair with repair of sigmoid colon enterotomy. No acute post-op issues. Continue plan of care per primary team.    Sam Lewis MD, PGY-1  General Surgery Resident    "

## 2024-12-21 ENCOUNTER — APPOINTMENT (OUTPATIENT)
Dept: PHYSICAL THERAPY | Facility: CLINIC | Age: 74
End: 2024-12-21
Attending: PHYSICIAN ASSISTANT
Payer: COMMERCIAL

## 2024-12-21 ENCOUNTER — APPOINTMENT (OUTPATIENT)
Dept: SPEECH THERAPY | Facility: CLINIC | Age: 74
End: 2024-12-21
Attending: PHYSICIAN ASSISTANT
Payer: COMMERCIAL

## 2024-12-21 LAB
ALBUMIN SERPL BCG-MCNC: 2.9 G/DL (ref 3.5–5.2)
ALP SERPL-CCNC: 63 U/L (ref 40–150)
ALT SERPL W P-5'-P-CCNC: <5 U/L (ref 0–70)
ANION GAP SERPL CALCULATED.3IONS-SCNC: 10 MMOL/L (ref 7–15)
AST SERPL W P-5'-P-CCNC: 17 U/L (ref 0–45)
BACTERIA ABSC ANAEROBE+AEROBE CULT: NO GROWTH
BILIRUB SERPL-MCNC: 0.6 MG/DL
BUN SERPL-MCNC: 20.1 MG/DL (ref 8–23)
CALCIUM SERPL-MCNC: 8.2 MG/DL (ref 8.8–10.4)
CHLORIDE SERPL-SCNC: 109 MMOL/L (ref 98–107)
CREAT SERPL-MCNC: 0.86 MG/DL (ref 0.67–1.17)
EGFRCR SERPLBLD CKD-EPI 2021: >90 ML/MIN/1.73M2
ERYTHROCYTE [DISTWIDTH] IN BLOOD BY AUTOMATED COUNT: 16.5 % (ref 10–15)
GLUCOSE BLDC GLUCOMTR-MCNC: 105 MG/DL (ref 70–99)
GLUCOSE BLDC GLUCOMTR-MCNC: 109 MG/DL (ref 70–99)
GLUCOSE BLDC GLUCOMTR-MCNC: 126 MG/DL (ref 70–99)
GLUCOSE BLDC GLUCOMTR-MCNC: 97 MG/DL (ref 70–99)
GLUCOSE SERPL-MCNC: 115 MG/DL (ref 70–99)
HCO3 SERPL-SCNC: 23 MMOL/L (ref 22–29)
HCT VFR BLD AUTO: 30.4 % (ref 40–53)
HGB BLD-MCNC: 9.2 G/DL (ref 13.3–17.7)
INR PPP: 1.37 (ref 0.85–1.15)
INR PPP: 1.4 (ref 0.85–1.15)
MAGNESIUM SERPL-MCNC: 2.1 MG/DL (ref 1.7–2.3)
MCH RBC QN AUTO: 24 PG (ref 26.5–33)
MCHC RBC AUTO-ENTMCNC: 30.3 G/DL (ref 31.5–36.5)
MCV RBC AUTO: 79 FL (ref 78–100)
PHOSPHATE SERPL-MCNC: 2.8 MG/DL (ref 2.5–4.5)
PLATELET # BLD AUTO: 154 10E3/UL (ref 150–450)
POTASSIUM SERPL-SCNC: 4.2 MMOL/L (ref 3.4–5.3)
PROT SERPL-MCNC: 6.8 G/DL (ref 6.4–8.3)
RBC # BLD AUTO: 3.84 10E6/UL (ref 4.4–5.9)
SODIUM SERPL-SCNC: 142 MMOL/L (ref 135–145)
WBC # BLD AUTO: 8.4 10E3/UL (ref 4–11)

## 2024-12-21 PROCEDURE — 97530 THERAPEUTIC ACTIVITIES: CPT | Mod: GP | Performed by: STUDENT IN AN ORGANIZED HEALTH CARE EDUCATION/TRAINING PROGRAM

## 2024-12-21 PROCEDURE — 84100 ASSAY OF PHOSPHORUS: CPT | Performed by: TRANSPLANT SURGERY

## 2024-12-21 PROCEDURE — B4185 PARENTERAL SOL 10 GM LIPIDS: HCPCS | Performed by: TRANSPLANT SURGERY

## 2024-12-21 PROCEDURE — 120N000011 HC R&B TRANSPLANT UMMC

## 2024-12-21 PROCEDURE — 85014 HEMATOCRIT: CPT | Performed by: STUDENT IN AN ORGANIZED HEALTH CARE EDUCATION/TRAINING PROGRAM

## 2024-12-21 PROCEDURE — 85048 AUTOMATED LEUKOCYTE COUNT: CPT | Performed by: STUDENT IN AN ORGANIZED HEALTH CARE EDUCATION/TRAINING PROGRAM

## 2024-12-21 PROCEDURE — 36415 COLL VENOUS BLD VENIPUNCTURE: CPT | Performed by: STUDENT IN AN ORGANIZED HEALTH CARE EDUCATION/TRAINING PROGRAM

## 2024-12-21 PROCEDURE — 999N000127 HC STATISTIC PERIPHERAL IV START W US GUIDANCE

## 2024-12-21 PROCEDURE — 250N000009 HC RX 250: Performed by: TRANSPLANT SURGERY

## 2024-12-21 PROCEDURE — 92610 EVALUATE SWALLOWING FUNCTION: CPT | Mod: GN

## 2024-12-21 PROCEDURE — 250N000013 HC RX MED GY IP 250 OP 250 PS 637: Performed by: PHYSICIAN ASSISTANT

## 2024-12-21 PROCEDURE — 85610 PROTHROMBIN TIME: CPT | Performed by: STUDENT IN AN ORGANIZED HEALTH CARE EDUCATION/TRAINING PROGRAM

## 2024-12-21 PROCEDURE — 85610 PROTHROMBIN TIME: CPT | Performed by: PHYSICIAN ASSISTANT

## 2024-12-21 PROCEDURE — 92526 ORAL FUNCTION THERAPY: CPT | Mod: GN

## 2024-12-21 PROCEDURE — 83735 ASSAY OF MAGNESIUM: CPT | Performed by: TRANSPLANT SURGERY

## 2024-12-21 PROCEDURE — 97112 NEUROMUSCULAR REEDUCATION: CPT | Mod: GP | Performed by: STUDENT IN AN ORGANIZED HEALTH CARE EDUCATION/TRAINING PROGRAM

## 2024-12-21 PROCEDURE — 82374 ASSAY BLOOD CARBON DIOXIDE: CPT | Performed by: STUDENT IN AN ORGANIZED HEALTH CARE EDUCATION/TRAINING PROGRAM

## 2024-12-21 PROCEDURE — 250N000009 HC RX 250

## 2024-12-21 PROCEDURE — 250N000011 HC RX IP 250 OP 636: Performed by: TRANSPLANT SURGERY

## 2024-12-21 PROCEDURE — 250N000013 HC RX MED GY IP 250 OP 250 PS 637: Performed by: STUDENT IN AN ORGANIZED HEALTH CARE EDUCATION/TRAINING PROGRAM

## 2024-12-21 PROCEDURE — 250N000013 HC RX MED GY IP 250 OP 250 PS 637: Performed by: TRANSPLANT SURGERY

## 2024-12-21 PROCEDURE — 82310 ASSAY OF CALCIUM: CPT | Performed by: STUDENT IN AN ORGANIZED HEALTH CARE EDUCATION/TRAINING PROGRAM

## 2024-12-21 PROCEDURE — 250N000009 HC RX 250: Performed by: PHYSICIAN ASSISTANT

## 2024-12-21 PROCEDURE — 97161 PT EVAL LOW COMPLEX 20 MIN: CPT | Mod: GP | Performed by: STUDENT IN AN ORGANIZED HEALTH CARE EDUCATION/TRAINING PROGRAM

## 2024-12-21 PROCEDURE — 36415 COLL VENOUS BLD VENIPUNCTURE: CPT | Performed by: PHYSICIAN ASSISTANT

## 2024-12-21 PROCEDURE — 250N000011 HC RX IP 250 OP 636: Performed by: PHYSICIAN ASSISTANT

## 2024-12-21 RX ORDER — WARFARIN SODIUM 3 MG/1
3 TABLET ORAL
Status: COMPLETED | OUTPATIENT
Start: 2024-12-21 | End: 2024-12-21

## 2024-12-21 RX ADMIN — RIFAXIMIN 550 MG: 550 TABLET ORAL at 08:35

## 2024-12-21 RX ADMIN — RIFAXIMIN 550 MG: 550 TABLET ORAL at 21:06

## 2024-12-21 RX ADMIN — FLUCONAZOLE 400 MG: 2 INJECTION, SOLUTION INTRAVENOUS at 09:48

## 2024-12-21 RX ADMIN — PIPERACILLIN SODIUM AND TAZOBACTAM SODIUM 3.38 G: 3; .375 INJECTION, SOLUTION INTRAVENOUS at 22:46

## 2024-12-21 RX ADMIN — ACETAMINOPHEN 975 MG: 325 TABLET, FILM COATED ORAL at 08:35

## 2024-12-21 RX ADMIN — ACETAMINOPHEN 975 MG: 325 TABLET, FILM COATED ORAL at 16:23

## 2024-12-21 RX ADMIN — ACETAMINOPHEN 975 MG: 325 TABLET, FILM COATED ORAL at 02:03

## 2024-12-21 RX ADMIN — POLYETHYLENE GLYCOL 3350 17 G: 17 POWDER, FOR SOLUTION ORAL at 08:35

## 2024-12-21 RX ADMIN — PIPERACILLIN SODIUM AND TAZOBACTAM SODIUM 3.38 G: 3; .375 INJECTION, SOLUTION INTRAVENOUS at 08:24

## 2024-12-21 RX ADMIN — MAGNESIUM SULFATE HEPTAHYDRATE: 500 INJECTION, SOLUTION INTRAMUSCULAR; INTRAVENOUS at 21:05

## 2024-12-21 RX ADMIN — HEPARIN SODIUM 5000 UNITS: 5000 INJECTION, SOLUTION INTRAVENOUS; SUBCUTANEOUS at 18:07

## 2024-12-21 RX ADMIN — PANTOPRAZOLE SODIUM 40 MG: 40 INJECTION, POWDER, FOR SOLUTION INTRAVENOUS at 21:06

## 2024-12-21 RX ADMIN — Medication 1500 MG: at 06:09

## 2024-12-21 RX ADMIN — WARFARIN SODIUM 3 MG: 3 TABLET ORAL at 18:07

## 2024-12-21 RX ADMIN — SENNOSIDES AND DOCUSATE SODIUM 1 TABLET: 50; 8.6 TABLET ORAL at 08:35

## 2024-12-21 RX ADMIN — PIPERACILLIN SODIUM AND TAZOBACTAM SODIUM 3.38 G: 3; .375 INJECTION, SOLUTION INTRAVENOUS at 02:03

## 2024-12-21 RX ADMIN — PIPERACILLIN SODIUM AND TAZOBACTAM SODIUM 3.38 G: 3; .375 INJECTION, SOLUTION INTRAVENOUS at 14:49

## 2024-12-21 RX ADMIN — OLIVE OIL AND SOYBEAN OIL 250 ML: 16; 4 INJECTION, EMULSION INTRAVENOUS at 21:06

## 2024-12-21 RX ADMIN — HEPARIN SODIUM 5000 UNITS: 5000 INJECTION, SOLUTION INTRAVENOUS; SUBCUTANEOUS at 06:18

## 2024-12-21 ASSESSMENT — ACTIVITIES OF DAILY LIVING (ADL)
ADLS_ACUITY_SCORE: 33

## 2024-12-21 NOTE — PLAN OF CARE
Goal Outcome Evaluation:      Plan of Care Reviewed With: patient           Patient will have a safe discharge plan.  He anticipates a home discharge, and has family to provide transportation at discharge.

## 2024-12-21 NOTE — PLAN OF CARE
"Goal Outcome Evaluation:    BP 98/60 (BP Location: Right arm)   Pulse 72   Temp 98  F (36.7  C) (Oral)   Resp 16   Ht 1.702 m (5' 7\")   Wt 60.1 kg (132 lb 8 oz)   SpO2 98%   BMI 20.75 kg/m      Shift: 2738-5670    VS: Tmax 99.1, soft Bps  Neuro: Aox4  Cardio: WDL  Respiratory: WDL on RA  GI: no post op BM, passing gas, normal BS  : Voiding adequately  Skin: abd incision, SEGUNDO  Diet: Clears    BG: q6  LDA: L PIV, L SEGUNDO  Infusions: TPN and lipids, abx  Mobility: SBA  Pain/Nausea: minimal pain  PRN medications: none given    "

## 2024-12-21 NOTE — PHARMACY-ANTICOAGULATION SERVICE
Clinical Pharmacy - Warfarin Dosing Consult     Pharmacy has been consulted to manage this patient s warfarin therapy.  Indication: DVT/PE Prophylaxis  Therapy Goal: INR 2-3  OP Anticoag Clinic: [facility] WAR[facility]IN  Warfarin Prior to Admission: Yes  Warfarin PTA Regimen: 5 mg Monday and Friday   4 mg all other days of the week    INR   Date Value Ref Range Status   12/21/2024 1.37 (H) 0.85 - 1.15 Final   12/21/2024 1.40 (H) 0.85 - 1.15 Final       Recommend warfarin 3 mg today.  Pharmacy will monitor Les Morel daily and order warfarin doses to achieve specified goal.      Please contact pharmacy as soon as possible if the warfarin needs to be held for a procedure or if the warfarin goals change.       Clay Ribera, LelaD

## 2024-12-21 NOTE — CONSULTS
Care Management Initial Consult    General Information  Assessment completed with: PatientLes  Type of CM/SW Visit: Initial Assessment    Primary Care Provider verified and updated as needed: Yes   Readmission within the last 30 days: no previous admission in last 30 days      Reason for Consult: other (see comments) (elevated readmission risk score)  Advance Care Planning: Advance Care Planning Reviewed: questions answered, other (see comments) (Patient provided a copy of his [place] advanced directive. I will send to [employer] for their review.)   His wife Priya is named as the primary health care agent, son Rian is the second choice and daughter Macey is the choice.       Communication Assessment  Patient's communication style: spoken language (English or Bilingual)    Hearing Difficulty or Deaf: yes   Wear Glasses or Blind: no    Cognitive  Cognitive/Neuro/Behavioral: WDL                      Living Environment:   People in home: spouse  wife Priya  Current living Arrangements: house      Able to return to prior arrangements: yes       Family/Social Support:  Care provided by: self  Provides care for: no one  Marital Status:  (46 years)  Support system: Wife, Children  Priya       Description of Support System: Supportive, Involved    Support Assessment: Adequate family and caregiver support, Adequate social supports    Current Resources:   Patient receiving home care services: No        Community Resources: None  Equipment currently used at home: none  Supplies currently used at home: None    Employment/Financial:  Employment Status: retired        Financial Concerns: other (see comments) (medical bills)   Referral to Financial Worker: No  Patient has financial concerns related to medical bills.  He is utilizing a program through [employer] to spread out his payments (Clear Balance).  Les has explored eligibility for Medicaid in [place] but was told he is over income.  Patient receives  Social Security longterm benefits, and his wife has employment income.      Les reports he has already met his out of pocket max under his [employer] Medicare Advantage Plan.  He is enrolled in a program for free xifaxan through [employer]. He is unsure if he will qualify for 2025 due to changes in eligibility.  He is working with a nurse at [facility].  I also mentioned using [facility] for this medication.    Does the patient's insurance plan have a 3 day qualifying hospital stay waiver?  No    Lifestyle & Psychosocial Needs:  Social Drivers of Health     Food Insecurity: No Food Insecurity (9/29/2024)    Received from [employer]    Hunger Vital Sign     Worried About Running Out of Food in the Last Year: Never true     Ran Out of Food in the Last Year: Never true   Depression: Not at risk (12/10/2024)    Received from [facility]    PHQ-2     PHQ-2 Total: 0   Housing Stability: Low Risk  (9/29/2024)    Received from [facility]    Housing Stability Vital Sign     Unable to Pay for Housing in the Last Year: No     Number of Times Moved in the Last Year: 0     Homeless in the Last Year: No   Tobacco Use: Low Risk  (12/19/2024)    Patient History     Smoking Tobacco Use: Never     Smokeless Tobacco Use: Never     Passive Exposure: Never   Financial Resource Strain: Low Risk  (4/28/2023)    Received from [facility]    Overall Financial Resource Strain (CARDIA)     Difficulty of Paying Living Expenses: Not hard at all   Alcohol Use: Not At Risk (4/29/2024)    Received from [facility]    AUDIT-C     Frequency of Alcohol Consumption: Never     Average Number of Drinks: Patient does not drink     Frequency of Binge Drinking: Never   Transportation Needs: No Transportation Needs (9/29/2024)    Received from [facility] [facility]    PRAPARE - Transportation     Lack of Transportation (Medical): No     Lack of Transportation (Non-Medical): No   Physical Activity: Inactive (4/29/2024)    Received from [facility] [email or web address] [facility]    Exercise Vital Sign     Days of Exercise per Week: 0 days     Minutes of Exercise per Session: 0 min   Interpersonal Safety: Low Risk  (12/19/2024)    Interpersonal Safety     Do you feel physically and emotionally safe where you currently live?: Yes     Within the past 12 months, have you been hit, slapped, kicked or otherwise physically hurt by someone?: No     Within the past 12 months, have you been humiliated or emotionally abused in other ways by your partner or ex-partner?: No   Stress: No Stress Concern Present (4/29/2024)    [facility] [email or web address] [facility]    [employer] - Occupational Stress Questionnaire     Feeling of Stress : Not at all   Social Connections: Moderately Isolated (4/29/2024)    Received from [facility] [email or web address] [facility]    Social Connection and Isolation Panel [NHANES]     Frequency of Communication with Friends and Family: Twice a week     Frequency of Social Gatherings with Friends and Family: Once a week     Attends Judaism Services: Never     Active Member of Clubs or Organizations: No     Attends Club or Organization Meetings: Never     Marital Status:    Health Literacy: Not on file       Functional Status:  Prior to admission patient needed assistance: Les was independent with all ADLs and IADLs.               Mental Health Status:  Patient denies any mental health history.  He is coping appropriately post surgery.    Chemical Dependency Status:  Les reports a fifty year history of drinking, and he acknowledges it contributed to his liver disease.  He has been sober for 21 months.  He has no history of treatment, or engagement in AA.  Les has a  history of one DUI.          Values/Beliefs:  Spiritual, Cultural Beliefs, Rastafari Practices, Values that affect care: yes  Description of Beliefs that Will Affect Care: Nancy            Discussed  Partnership in Safe Discharge Planning  document with patient/family: Yes: patient    Additional Information:  Patient's wife or adult children (son Rian and daughter Macey both live in [place], [place]) will provide transportation at discharge.    Next Steps: Social work will remain available throughout hospitalization to assist as needs arise.     Jun Larsen, GWENDOLYN, [facility]  12/21/2024       Social Work and Care Management Department       SEARCHABLE in [facility] - search SOCIAL WORK       [place] (0800 - 1630) Saturday and Sunday     Units: 4A Vocera, 4C Vocera, & 4E Vocera        Units: 5A 8975-3683 Vocera, 5A 8623-4124 Vocera , BMT SW 1 BMT SW 2, BMT SW 3 & BMT SW 4  5C Off Service 5401 - 5416  5C Off Service [id number]     Units: 6A Vocera & 6B Vocera      Units: 6C Vocera     Units: 7A Vocera & 7B Vocera      Units: 7C Med Surg 7401 thru 7418 and 7C Med Surg 7502 thru 7521      Unit: [place] ED Vocera & [place] Obs Vocera     [facility] (8900-3041) Saturday and Sunday      Units: 5 Ortho Vocera, 5 Med Surg Vocera & WB ED Vocera     Units: 6 Med Surg Vocera, 8 Med Surg Vocera, & 10 ICU Vocera      After hours Vocera [facility] and After Hours Vocera [place]     Please NOTE changes to times below:    **Saturday & Sunday (1630 - 2030)    **Mon-Fri (6953-0663)     **[facility] Recognized Holidays  (0309-5120)    Units: ALL   - see above VOCERA links to units

## 2024-12-21 NOTE — PLAN OF CARE
"BP 94/56 (BP Location: Right arm)   Pulse 88   Temp 98.7  F (37.1  C) (Oral)   Resp 18   Ht 1.702 m (5' 7\")   Wt 60.1 kg (132 lb 7.9 oz)   SpO2 94%   BMI 20.75 kg/m      9791-0260  Soft BP (team aware), OVSS on RA. A+Ox4. Q6 blood sugar checks, 123. bClear liquid diet, denies nausea. PIV w/TPN infusing and 1 PIV SL. SEGUNDO in place. LBM 12/18. Up SBA with walker. 2 negative PVRs after blount removal on previous shift. Saving urine in urinal. Scheduled tylenol given. Will continue to monitor and notify team with changes.   "

## 2024-12-21 NOTE — PROGRESS NOTES
12/21/24 1100   Appointment Info   Signing Clinician's Name / Credentials (PT) Dilia Avila   Living Environment   People in Home spouse   Current Living Arrangements house   Home Accessibility stairs within home   Number of Stairs, Within Home, Primary two   Stair Railings, Within Home, Primary none   Transportation Anticipated family or friend will provide   Living Environment Comments Patient lives with his wife but she works outside the home   Self-Care   Usual Activity Tolerance good   Current Activity Tolerance good   Equipment Currently Used at Home none   Fall history within last six months yes   Number of times patient has fallen within last six months   (In the past, doesn't fall since stopped drinking)   Activity/Exercise/Self-Care Comment Patient reports he is independent at baseline. Has a new lift chair   General Information   Onset of Illness/Injury or Date of Surgery 12/19/24   Referring Physician Hilary Werner   Patient/Family Therapy Goals Statement (PT) Get back home   Pertinent History of Current Problem (include personal factors and/or comorbidities that impact the POC) 74 year old male with reported history of recurrent pulmonary embolisms, interstitial lung disease, alcohol associated cirrhosis, portal hypertension status post TIPS and gout.  He underwent left inguinal hernia and chronically incarcerated sigmoid colon repair with enterotomy, developed a small abscess on 12/19/2024 and admitted for monitoring. Hematology is consulted for recommendations regarding DOAC.   Existing Precautions/Restrictions abdominal   Heart Disease Risk Factors Lack of physical activity;Medical history;Gender;Age   General Observations Up with assistance   Cognition   Affect/Mental Status (Cognition) WNL   Orientation Status (Cognition) oriented x 4   Pain Assessment   Patient Currently in Pain No   Integumentary/Edema   Integumentary/Edema Comments Not observed   Range of Motion (ROM)   ROM Comment WNL    Strength (Manual Muscle Testing)   Strength Comments Some deconditioning but overall at least 3/5 with transfers and gait   Bed Mobility   Comment, (Bed Mobility) Supine to sit independently but poor adherence to precautions- reiterated logroll technique   Transfers   Comment, (Transfers) Scoots with SBA and verbal cues for abdominal precautions, sit to stand with SBA only and vcs to minimize UE push off   Gait/Stairs (Locomotion)   Comment, (Gait/Stairs) Ambulates with shortened step length B, en block turns and mild unsteadiness. SBA throughout   Balance   Balance Comments Impaired higher level balance   Sensory Examination   Sensory Perception Comments Reports no neuropathy but has hearing loss- suspect some vestibular involvement based on ETOH history   Clinical Impression   Criteria for Skilled Therapeutic Intervention Yes, treatment indicated   PT Diagnosis (PT) Impaired functional mobility   Influenced by the following impairments Higher level balance, strength deficits   Functional limitations due to impairments Transfers, gait and stairs   Clinical Presentation (PT Evaluation Complexity) stable   Clinical Presentation Rationale clinical judgment   Clinical Decision Making (Complexity) low complexity   Planned Therapy Interventions (PT) balance training;bed mobility training;gait training;neuromuscular re-education;patient/family education;stair training;strengthening;transfer training;risk factor education;progressive activity/exercise;home program guidelines   Risk & Benefits of therapy have been explained evaluation/treatment results reviewed;care plan/treatment goals reviewed;patient;spouse/significant other   Clinical Impression Comments Patient presents with higher level balance deficits and some higher risk for falls. Anticipate will be able to discharge home with wife when medically ready though OP PT would be helpful to address falls risk. Per patient, money is tight and likely will not be able to  attend OP PT. Given HEP to address balance deficits   PT Total Evaluation Time   PT Eval, Low Complexity Minutes (32341) 8   Physical Therapy Goals   PT Frequency 6x/week   PT Predicted Duration/Target Date for Goal Attainment 12/26/24   PT Goals PT Goal 1;Gait;Stairs;Transfers;Bed Mobility   PT: Bed Mobility Independent;Supine to/from sit;Within precautions   PT: Transfers Independent;Sit to/from stand;Within precautions   PT: Gait Independent;Greater than 200 feet   PT: Stairs Independent;2 stairs   PT: Goal 1 Patient will demonstrate HEP for balance independently to address higher level balance deficits and minimize risk for future falls.   Interventions   Interventions Quick Adds Gait Training;Therapeutic Activity;Therapeutic Procedure;Neuromuscular Re-ed   Therapeutic Procedure/Exercise   Ther. Procedure: strength, endurance, ROM, flexibillity Minutes (36377) 2   Treatment Detail/Skilled Intervention PT: Repeated sit to stands for functional strengthening x 10 reps with UEs crossed over chest. Given as HEP.   Therapeutic Activity   Therapeutic Activities: dynamic activities to improve functional performance Minutes (70541) 24   Treatment Detail/Skilled Intervention PT: Patient greeted supine in bed agreeable to session. Educated on role of PT in hospital and DC planning. Pt demonstrates poor adherence to abdominal precautions- pushing up via sit up for bed mobility. Verbally walked through logroll technique several times in session and discussed how to avoid twisting with transfers. Patient tends to rush and lift with UEs to get hips scooted to EOB. Instructed in hip walking to minimize UE pushoff and abdominal strain as well as avoid valsalva. Patient worked on repeated sit to stands for functional strengthening. Given seated exercises handout as well with directions and modifications. patient also reports minimal ability to pay for OP PT. Discussed importance of addressing current falls risk and given balance  exercise 1 and 2 for use at home. Discussed wife standing nearby and safety strategies to work through. Following ambulation, discussed benefits of walking and sitting up to promote gastric motility. Verbalized understanding.   Neuromuscular Re-education   Neuromuscular Re-Education Minutes (98904) 15   Treatment Detail/Skilled Intervention Patient ambulated ~400ft total with SBA. Demonstrates PD type gait with B shortened step length and en block turns. Patient able to participate in dynamic walking activities including walking backwards, sidestepping, walking with pivots (en block), walking at fast/slow speeds and walking with head turns. No overt LOB, but is much more hesitant and slows down with head turns in particular. Acknowledges balance is worrisome. Open to HEP to address concerns.   PT Discharge Planning   PT Plan PT: Flat bed mobility, gait with balance challenges (given balance exercises 1,2 for handouts for HEP- go over these at next session). Formal balance test such as DGI or FGA   PT Discharge Recommendation (DC Rec) home with assist;home with outpatient physical therapy;home with home care physical therapy   PT Rationale for DC Rec Patient presents with higher level balance deficits and some higher risk for falls. Anticipate will be able to discharge home with wife when medically ready though OP PT would be helpful to address falls risk. Per patient, money is tight and likely will not be able to attend OP PT. Given HEP to address balance deficits   PT Brief overview of current status SBA with no AD   Physical Therapy Time and Intention   Timed Code Treatment Minutes 41   Total Session Time (sum of timed and untimed services) 49

## 2024-12-22 LAB
ALBUMIN SERPL BCG-MCNC: 2.8 G/DL (ref 3.5–5.2)
ALP SERPL-CCNC: 58 U/L (ref 40–150)
ALT SERPL W P-5'-P-CCNC: <5 U/L (ref 0–70)
ANION GAP SERPL CALCULATED.3IONS-SCNC: 10 MMOL/L (ref 7–15)
AST SERPL W P-5'-P-CCNC: 15 U/L (ref 0–45)
BILIRUB SERPL-MCNC: 0.5 MG/DL
BUN SERPL-MCNC: 18.9 MG/DL (ref 8–23)
CALCIUM SERPL-MCNC: 8.6 MG/DL (ref 8.8–10.4)
CHLORIDE SERPL-SCNC: 104 MMOL/L (ref 98–107)
CREAT SERPL-MCNC: 0.83 MG/DL (ref 0.67–1.17)
EGFRCR SERPLBLD CKD-EPI 2021: >90 ML/MIN/1.73M2
ERYTHROCYTE [DISTWIDTH] IN BLOOD BY AUTOMATED COUNT: 16.4 % (ref 10–15)
GLUCOSE BLDC GLUCOMTR-MCNC: 140 MG/DL (ref 70–99)
GLUCOSE BLDC GLUCOMTR-MCNC: 93 MG/DL (ref 70–99)
GLUCOSE SERPL-MCNC: 108 MG/DL (ref 70–99)
HCO3 SERPL-SCNC: 22 MMOL/L (ref 22–29)
HCT VFR BLD AUTO: 28.4 % (ref 40–53)
HGB BLD-MCNC: 8.8 G/DL (ref 13.3–17.7)
INR PPP: 1.33 (ref 0.85–1.15)
MAGNESIUM SERPL-MCNC: 2.2 MG/DL (ref 1.7–2.3)
MCH RBC QN AUTO: 24.3 PG (ref 26.5–33)
MCHC RBC AUTO-ENTMCNC: 31 G/DL (ref 31.5–36.5)
MCV RBC AUTO: 79 FL (ref 78–100)
PHOSPHATE SERPL-MCNC: 3.7 MG/DL (ref 2.5–4.5)
PLATELET # BLD AUTO: 170 10E3/UL (ref 150–450)
POTASSIUM SERPL-SCNC: 4.2 MMOL/L (ref 3.4–5.3)
PROT SERPL-MCNC: 6.6 G/DL (ref 6.4–8.3)
RBC # BLD AUTO: 3.62 10E6/UL (ref 4.4–5.9)
SODIUM SERPL-SCNC: 136 MMOL/L (ref 135–145)
VANCOMYCIN SERPL-MCNC: 10 UG/ML
WBC # BLD AUTO: 5.8 10E3/UL (ref 4–11)

## 2024-12-22 PROCEDURE — 82374 ASSAY BLOOD CARBON DIOXIDE: CPT | Performed by: STUDENT IN AN ORGANIZED HEALTH CARE EDUCATION/TRAINING PROGRAM

## 2024-12-22 PROCEDURE — 250N000013 HC RX MED GY IP 250 OP 250 PS 637: Performed by: STUDENT IN AN ORGANIZED HEALTH CARE EDUCATION/TRAINING PROGRAM

## 2024-12-22 PROCEDURE — 120N000011 HC R&B TRANSPLANT UMMC

## 2024-12-22 PROCEDURE — 84100 ASSAY OF PHOSPHORUS: CPT | Performed by: TRANSPLANT SURGERY

## 2024-12-22 PROCEDURE — 250N000013 HC RX MED GY IP 250 OP 250 PS 637: Performed by: PHYSICIAN ASSISTANT

## 2024-12-22 PROCEDURE — 36415 COLL VENOUS BLD VENIPUNCTURE: CPT | Performed by: TRANSPLANT SURGERY

## 2024-12-22 PROCEDURE — 85610 PROTHROMBIN TIME: CPT | Performed by: PHYSICIAN ASSISTANT

## 2024-12-22 PROCEDURE — 250N000013 HC RX MED GY IP 250 OP 250 PS 637: Performed by: TRANSPLANT SURGERY

## 2024-12-22 PROCEDURE — 250N000009 HC RX 250: Performed by: TRANSPLANT SURGERY

## 2024-12-22 PROCEDURE — 85048 AUTOMATED LEUKOCYTE COUNT: CPT | Performed by: STUDENT IN AN ORGANIZED HEALTH CARE EDUCATION/TRAINING PROGRAM

## 2024-12-22 PROCEDURE — 83735 ASSAY OF MAGNESIUM: CPT | Performed by: TRANSPLANT SURGERY

## 2024-12-22 PROCEDURE — B4185 PARENTERAL SOL 10 GM LIPIDS: HCPCS | Performed by: TRANSPLANT SURGERY

## 2024-12-22 PROCEDURE — 250N000011 HC RX IP 250 OP 636: Performed by: TRANSPLANT SURGERY

## 2024-12-22 PROCEDURE — 85014 HEMATOCRIT: CPT | Performed by: STUDENT IN AN ORGANIZED HEALTH CARE EDUCATION/TRAINING PROGRAM

## 2024-12-22 PROCEDURE — 80202 ASSAY OF VANCOMYCIN: CPT | Performed by: TRANSPLANT SURGERY

## 2024-12-22 PROCEDURE — 250N000009 HC RX 250

## 2024-12-22 PROCEDURE — 999N000248 HC STATISTIC IV INSERT WITH US BY RN

## 2024-12-22 PROCEDURE — 82310 ASSAY OF CALCIUM: CPT | Performed by: STUDENT IN AN ORGANIZED HEALTH CARE EDUCATION/TRAINING PROGRAM

## 2024-12-22 PROCEDURE — 250N000011 HC RX IP 250 OP 636: Performed by: PHYSICIAN ASSISTANT

## 2024-12-22 RX ORDER — WARFARIN SODIUM 3 MG/1
3 TABLET ORAL
Status: COMPLETED | OUTPATIENT
Start: 2024-12-22 | End: 2024-12-22

## 2024-12-22 RX ORDER — FLUCONAZOLE 200 MG/1
400 TABLET ORAL DAILY
Status: DISCONTINUED | OUTPATIENT
Start: 2024-12-23 | End: 2024-12-23

## 2024-12-22 RX ORDER — VANCOMYCIN HYDROCHLORIDE
1500
Status: DISCONTINUED | OUTPATIENT
Start: 2024-12-23 | End: 2024-12-23

## 2024-12-22 RX ADMIN — RIFAXIMIN 550 MG: 550 TABLET ORAL at 07:57

## 2024-12-22 RX ADMIN — HEPARIN SODIUM 5000 UNITS: 5000 INJECTION, SOLUTION INTRAVENOUS; SUBCUTANEOUS at 18:13

## 2024-12-22 RX ADMIN — PANTOPRAZOLE SODIUM 40 MG: 40 INJECTION, POWDER, FOR SOLUTION INTRAVENOUS at 20:06

## 2024-12-22 RX ADMIN — MAGNESIUM SULFATE HEPTAHYDRATE: 500 INJECTION, SOLUTION INTRAMUSCULAR; INTRAVENOUS at 20:53

## 2024-12-22 RX ADMIN — WARFARIN SODIUM 3 MG: 3 TABLET ORAL at 18:13

## 2024-12-22 RX ADMIN — HEPARIN SODIUM 5000 UNITS: 5000 INJECTION, SOLUTION INTRAVENOUS; SUBCUTANEOUS at 06:40

## 2024-12-22 RX ADMIN — Medication 1500 MG: at 06:32

## 2024-12-22 RX ADMIN — PIPERACILLIN SODIUM AND TAZOBACTAM SODIUM 3.38 G: 3; .375 INJECTION, SOLUTION INTRAVENOUS at 21:34

## 2024-12-22 RX ADMIN — OLIVE OIL AND SOYBEAN OIL 250 ML: 16; 4 INJECTION, EMULSION INTRAVENOUS at 20:53

## 2024-12-22 RX ADMIN — FLUCONAZOLE 400 MG: 2 INJECTION, SOLUTION INTRAVENOUS at 09:21

## 2024-12-22 RX ADMIN — PIPERACILLIN SODIUM AND TAZOBACTAM SODIUM 3.38 G: 3; .375 INJECTION, SOLUTION INTRAVENOUS at 15:27

## 2024-12-22 RX ADMIN — PIPERACILLIN SODIUM AND TAZOBACTAM SODIUM 3.38 G: 3; .375 INJECTION, SOLUTION INTRAVENOUS at 08:35

## 2024-12-22 RX ADMIN — ACETAMINOPHEN 650 MG: 325 TABLET, FILM COATED ORAL at 20:12

## 2024-12-22 RX ADMIN — RIFAXIMIN 550 MG: 550 TABLET ORAL at 20:06

## 2024-12-22 RX ADMIN — ACETAMINOPHEN 975 MG: 325 TABLET, FILM COATED ORAL at 08:27

## 2024-12-22 RX ADMIN — PIPERACILLIN SODIUM AND TAZOBACTAM SODIUM 3.38 G: 3; .375 INJECTION, SOLUTION INTRAVENOUS at 02:29

## 2024-12-22 RX ADMIN — ACETAMINOPHEN 975 MG: 325 TABLET, FILM COATED ORAL at 02:25

## 2024-12-22 ASSESSMENT — ACTIVITIES OF DAILY LIVING (ADL)
ADLS_ACUITY_SCORE: 35
ADLS_ACUITY_SCORE: 33
ADLS_ACUITY_SCORE: 33
ADLS_ACUITY_SCORE: 35
ADLS_ACUITY_SCORE: 33
ADLS_ACUITY_SCORE: 35
ADLS_ACUITY_SCORE: 33
ADLS_ACUITY_SCORE: 35
ADLS_ACUITY_SCORE: 33
ADLS_ACUITY_SCORE: 35
ADLS_ACUITY_SCORE: 33
ADLS_ACUITY_SCORE: 35

## 2024-12-22 NOTE — CONSULTS
Care Management Follow Up    Length of Stay (days): 3    Expected Discharge Date: 12/23/2024     Concerns to be Addressed: financial/insurance, other (see comments) ([facility] care program)     Patient plan of care discussed at interdisciplinary rounds: Yes    Anticipated Discharge Disposition: Home              Anticipated Discharge Services:    Anticipated Discharge DME:      Patient/family educated on Medicare website which has current facility and service quality ratings:    Education Provided on the Discharge Plan:    Patient/Family in Agreement with the Plan:      Referrals Placed by CM/SW:    Private pay costs discussed: Not applicable    Discussed  Partnership in Safe Discharge Planning  document with patient/family: No     Handoff Completed: No, handoff not indicated or clinically appropriate    Additional Information:  CMA completed on 12/21/2024.  SDOH consult received for food insecurity, housing, transportation and financial.  Please refer to initial CMA as above concerns addressed.      Next Steps:   Follow should discharge needs be indicated.    Alena Daniel RN BSN, PHN, ACM-RN  December 22, 2024  7A Nurse Coordinator    Phone: [phone]  Available on [employer] 7A SOT RNCC  Weekend/Holiday 7A SOT RNCC    12/22/2024

## 2024-12-22 NOTE — PLAN OF CARE
"Goal Outcome Evaluation:      Plan of Care Reviewed With: patient    Overall Patient Progress: improvingOverall Patient Progress: improving    Outcome Evaluation: had a large BM, minimal pain    /69 (BP Location: Right arm)   Pulse 69   Temp 98.6  F (37  C) (Oral)   Resp 16   Ht 1.702 m (5' 7\")   Wt 60.1 kg (132 lb 8 oz)   SpO2 100%   BMI 20.75 kg/m      Shift: 2370-0828  VS: stable  on RA, afebrile  Neuro: AOx4  B  Labs: pending am collection   Respiratory: WNL  Pain/Nausea/PRN: denies   Diet: clear liquid   LDA: PIV x2   SEGUNDO with minimal output   GI/: voiding adequately, Large Bm   Skin: abdominal incision stapled   Mobility: SBA  Plan: continue POC     Handoff given to following RN.    "

## 2024-12-22 NOTE — PROGRESS NOTES
Transplant Surgery  Inpatient Daily Progress Note  12/22/2024    Assessment & Plan: 74 year old male with history of left inguinal hernia. Past medical history of interstitial ling disease, recurrent PE on warfarin, alcohol associated cirrhosis, portal hypertension s/p TIPS, and gout. Patient is status post left inguinal hernia and repair of sigmoid colon enterotomy secondary to left inguinal hernia, sliding with chronically incarcerated sigmoid colon, with a small abscess on 12/19/24. Admitted for monitoring, PIV nutrition, and follow up gastrograffin study on 12/23.      Today:  -Continue PPN and CLD until at least 12/23  -Pharmacy managing warfarin. Continue heparin subcutaneous  -CT scan abd/pelvis with IV and rectal contrrast on 12/23  - PT, encourage ambulation    S/p left inguinal hernia repair complicated by colon enteromy s/p repair: POD 3   SEGUNDO serosanguinous output. Fluid culture no growth, prelim. Continue antibiotics. Monitor for infection.   -Plan for CT scan with IV/rectal  contrast on Monday per surgeon.   -hold bowel regimen including dulcolax supp     GI/Nutrition:   Diet: CLD. PPN continue until after CT scan reviewed  Cirrhosis, portal hypertension with hx TIPS: MELD Na 8.  PTA amiloride, lasix, lactulose, rifaximin. Restarted rifaximin, amiloride, and lasix. Hold lactulose.  .     Cardiorespiratory:   Low BP at baseline: SBP 90s-110s. HR 80s. Stable. Monitor    Hematology: stable  Hx PE: PTA on warfarin, prior to surgery lovenox to bridge. Consulted Hematology to evaluate if patient appropriate to start on DOAC for prevention recurrent PE. Hematology recommended to continue warfarin.   -Started warfarin 12/21  -Continue heparin subcutaneous ppx dosing    Access: PIV    Fluid/Electrolytes: Replete electrolytes PRN    Endocrine: No issues    Rheum:  Gout: PTA allopurinol. Continue allopurinol    : Vo removed. PVR negative. Stop Flomax, no indication     Infectious disease: afebrile. WBC WNL.  SEGUNDO serosanguinous output.   -Continue surgical ppx with Zosyn, Vanco, and diflucan due to colon enterotomy. Continue abx until CT abd/pelvis scan reviewed.   -12/19 abd fluid culture no growth, prelim    Prophylaxis: DVT/PE: heparin subcutaneous, start warfarin. GI - PPI    Activity: Up ad kailyn. Ambulate QID. PT consult    Disposition: 7A. Plan for discharge early next week.        NAA/Fellow/Resident Provider: Hilary Werner PA-C, [place]    Faculty: Russ Dominguez M.D.  __________________________________________________________________  Transplant History: Admitted 12/19/2024 for monitoring s/p colon enterotomy.  N/A, Postoperative day:      Interval History: History is obtained from the patient  Overnight events: Minimal pain. Adequate pain control with APAP. No BM. +Flatus. +BM    ROS:   A 10-point review of systems was negative except as noted above.    Meds:  Current Facility-Administered Medications   Medication Dose Route Frequency Provider Last Rate Last Admin    acetaminophen (TYLENOL) tablet 975 mg  975 mg Oral Q8H Rohan Mackey MD   975 mg at 12/22/24 0225    fluconazole (DIFLUCAN) intermittent infusion 400 mg  400 mg Intravenous Q24H Russ Dominguez  mL/hr at 12/21/24 0948 400 mg at 12/21/24 0948    heparin ANTICOAGULANT injection 5,000 Units  5,000 Units Subcutaneous Q12H Hilary Werner PA-C   5,000 Units at 12/22/24 0640    lipids plant base (CLINOLIPID) 20 % infusion 250 mL  250 mL Intravenous Q24H Russ Dominguez MD 20.8 mL/hr at 12/21/24 2106 250 mL at 12/21/24 2106    pantoprazole (PROTONIX) IV push injection 40 mg  40 mg Intravenous QPM Sam Lewis MD   40 mg at 12/21/24 2106    piperacillin-tazobactam (ZOSYN) intermittent infusion 3.375 g  3.375 g Intravenous Q6H Russ Dominguez  mL/hr at 12/22/24 0229 3.375 g at 12/22/24 0229    polyethylene glycol (MIRALAX) Packet 17 g  17 g Oral Daily Rohan Mackey MD   17 g at 12/21/24 0835    rifaximin (XIFAXAN)  "tablet 550 mg  550 mg Oral BID Hilary Werner PA-C   550 mg at 12/21/24 2106    senna-docusate (SENOKOT-S/PERICOLACE) 8.6-50 MG per tablet 1 tablet  1 tablet Oral BID Rohan Mackey MD   1 tablet at 12/21/24 0835    sodium chloride (PF) 0.9% PF flush 3 mL  3 mL Intracatheter Q8H Rohan Mackey MD   3 mL at 12/22/24 0226    [Held by provider] tamsulosin (FLOMAX) capsule 0.4 mg  0.4 mg Oral Daily Hilary Werner PA-C   0.4 mg at 12/20/24 1400    vancomycin (VANCOCIN) 1,500 mg in 0.9% NaCl 250 mL intermittent infusion  1,500 mg Intravenous Q24H Russ Dominguez .7 mL/hr at 12/22/24 0632 1,500 mg at 12/22/24 0632    Warfarin Dose Required Daily - Pharmacist Managed  1 each Oral See Admin Instructions Hilary Werner PA-C           Physical Exam:     Admit Weight: 60.1 kg (132 lb 7.9 oz)    Current vitals:   /65 (BP Location: Right arm)   Pulse 66   Temp 97.7  F (36.5  C) (Oral)   Resp 16   Ht 1.702 m (5' 7\")   Wt 60.6 kg (133 lb 11.2 oz)   SpO2 100%   BMI 20.94 kg/m           Vital sign ranges:    Temp:  [97.7  F (36.5  C)-98.6  F (37  C)] 97.7  F (36.5  C)  Pulse:  [65-78] 66  Resp:  [16-18] 16  BP: ()/(52-69) 105/65  SpO2:  [97 %-100 %] 100 %  Patient Vitals for the past 24 hrs:   BP Temp Temp src Pulse Resp SpO2 Weight   12/22/24 0604 105/65 97.7  F (36.5  C) Oral 66 16 100 % 60.6 kg (133 lb 11.2 oz)   12/22/24 0120 105/69 98.6  F (37  C) Oral 69 16 100 % --   12/21/24 2141 96/58 97.9  F (36.6  C) Oral 65 16 99 % --   12/21/24 1739 97/52 -- -- -- -- -- --   12/21/24 1736 94/56 97.9  F (36.6  C) Oral 67 18 97 % --   12/21/24 1351 127/60 98  F (36.7  C) Oral 70 16 99 % --   12/21/24 1014 101/61 97.7  F (36.5  C) Oral 78 16 98 % --     General Appearance: in no apparent distress.   Skin: normal, warm  Heart: well perfused  Lungs: Nonlabored resps on RA  Abdomen: The abdomen is soft, nontender  : left groin incision c/d/I. SEGUNDO serosanguinous output. blount is present. "   Extremities: edema: absent.   Neurologic: awake, alert and oriented.       Data:   CMP  Recent Labs   Lab 12/21/24  2143 12/21/24  1736 12/21/24  1202 12/21/24  0614 12/20/24  1147 12/20/24  0630   NA  --   --   --  142  --  134*   POTASSIUM  --   --   --  4.2  --  4.8   CHLORIDE  --   --   --  109*  --  102   CO2  --   --   --  23  --  24   GLC 97 109*   < > 115*   < > 135*   BUN  --   --   --  20.1  --  18.2   CR  --   --   --  0.86  --  0.89   GFRESTIMATED  --   --   --  >90  --  >90   CLARIBEL  --   --   --  8.2*  --  8.4*   MAG  --   --   --  2.1  --  2.3   PHOS  --   --   --  2.8  --  3.6   ALBUMIN  --   --   --  2.9*  --  3.2*   BILITOTAL  --   --   --  0.6  --  0.9   ALKPHOS  --   --   --  63  --  77   AST  --   --   --  17  --  24   ALT  --   --   --  <5  --  <5    < > = values in this interval not displayed.     CBC  Recent Labs   Lab 12/22/24  0645 12/21/24  0614   HGB 8.8* 9.2*   WBC 5.8 8.4    154     COAGS  Recent Labs   Lab 12/21/24  1306 12/21/24  0614   INR 1.37* 1.40*      Urinalysis  No lab results found.

## 2024-12-22 NOTE — PLAN OF CARE
"Goal Outcome Evaluation:      Plan of Care Reviewed With: patient    Overall Patient Progress: improvingOverall Patient Progress: improving    Outcome Evaluation: Walking well, bowel movements, well-controlled pain    /61 (BP Location: Right arm)   Pulse 65   Temp 97.4  F (36.3  C) (Oral)   Resp 18   Ht 1.702 m (5' 7\")   Wt 60.6 kg (133 lb 11.2 oz)   SpO2 100%   BMI 20.94 kg/m      Shift: 9488-3810  Isolation Status: NA  VS: WDL on RA, afebrile  Neuro: Aox4  Behaviors: pleasant, cooperative with cares, able to make his needs known  BG: q6h, 93 and 140  Labs/Imaging: K 4.2, Hgb 8.8, albumin 2.8, INR 1.33  Respiratory: Clear lung sounds, no shortness of breath reported.  IS encouraged  Cardiac: Regular rhythm/rate  Pain/Nausea: Mild pain on movement, denied nausea  PRN: NA  Diet: Clear liquid diet, fair to good appetite.  Peripheral TPN  LDA: RUE PIV x1.  SEGUNDO to bulb suction with 3 mL serosanguinous output.  Infusion(s): TPN continuous, but paused for Antibiotics:  Zosyn, Vancomycin, and Diflucan  GI/: Voiding well.  BM x2  Skin: LLQ abdominal incision, staples, closed  Mobility: Independent, ambulated around unit multiple times  Plan: Pelvis CT Monday, possible discharge afterwards.    "

## 2024-12-22 NOTE — PLAN OF CARE
"Goal Outcome Evaluation:      Plan of Care Reviewed With: patient, spouse    Overall Patient Progress: improvingOverall Patient Progress: improving    Outcome Evaluation: passing gas, ambulated, minimal pain    BP 97/52   Pulse 67   Temp 97.9  F (36.6  C) (Oral)   Resp 18   Ht 1.702 m (5' 7\")   Wt 60.1 kg (132 lb 8 oz)   SpO2 97%   BMI 20.75 kg/m      Shift: 1144-4698  Isolation Status: NA  VS: WDL on RA, afebrile  Neuro: Aox4  Behaviors: pleasant, cooperative with cares, able to make his need known  BG: q6h 105, 109  Labs/Imaging: Cl 109, Albumin 2.9, Hgb 9.2, INR 1.37  Respiratory: Clear lung sounds, IS encouraged  Cardiac: Regular rhythm/rate  Pain/Nausea: Minimal pain, denied nausea  PRN: NA  Diet: Clear liquid diet, fair appetite  LDA: PIV x2, SEGUNDO to bulb suction with 15 mL output  Infusion(s): Continuous PPN with lipids, Zosyn q6h, Vanco daily, fluconazole daily  GI/: Voiding, passing gas, no BM  Skin: LLQ abdominal incision closed with staples, LLQ SEGUNDO, red-blanchable sacrum  Mobility: SBA, ambulated in hallway  Events/Education: Speech consult  Plan: Continue clear liquid diet until Monday.  Pelvis CT Monday.    "

## 2024-12-22 NOTE — PHARMACY-VANCOMYCIN DOSING SERVICE
"Pharmacy Vancomycin Note  Date of Service 2024  Patient's  1950   74 year old, male    Indication:  enterotomy  Day of Therapy: 4  Current vancomycin regimen:  1500 mg IV q24h  Current vancomycin monitoring method: AUC  Current vancomycin therapeutic monitoring goal: 400-600 mg*h/L    [employer]ight[employer] Prediction of Current Vancomycin Regimen  Current estimated CrCl = Loading dose: N/A  Regimen: 1500 mg IV every 24 hours.  Start time: 06:32 on 2024  Exposure target: AUC24 (range)400-600 mg/L.hr   AUC24,ss: 362 mg/L.hr  Probability of AUC24 > 400: 27 %  Ctrough,ss: 8.5 mg/L  Probability of Ctrough,ss > 20: 1 %  Probability of nephrotoxicity (Lodise COLIN ): 5 %      Creatinine for last 3 days  2024:  4:36 PM Creatinine 0.94 mg/dL;  4:36 PM Creatinine 0.94 mg/dL  2024:  6:30 AM Creatinine 0.89 mg/dL  2024:  6:14 AM Creatinine 0.86 mg/dL  2024:  6:45 AM Creatinine 0.83 mg/dL    Recent Vancomycin Levels (past 3 days)  2024:  6:45 AM Vancomycin 10.0 ug/mL    Vancomycin IV Administrations (past 72 hours)                     vancomycin (VANCOCIN) 1,500 mg in 0.9% NaCl 250 mL intermittent infusion (mg) 1,500 mg New Bag 24 0632     1,500 mg New Bag 24 0609     1,500 mg New Bag 24 0523    vancomycin (VANCOCIN) 1,000 mg in 200 mL dextrose intermittent infusion (mg) 1,000 mg Given 24 1157                    Nephrotoxins and other renal medications (From now, onward)      Start     Dose/Rate Route Frequency Ordered Stop    24 0600  vancomycin (VANCOCIN) 1,500 mg in 0.9% NaCl 250 mL intermittent infusion         1,500 mg  166.7 mL/hr over 90 Minutes Intravenous EVERY 24 HOURS 24 1737      24 2000  piperacillin-tazobactam (ZOSYN) intermittent infusion 3.375 g        Note to Pharmacy: For [facility], [facility] and [facility]: For Zosyn-naive patients, use the \"Zosyn initial dose + extended infusion\" order panel.    3.375 g  100 mL/hr over 30 Minutes Intravenous " EVERY 6 HOURS 12/19/24 1834 12/26/24 1359               Contrast Orders - past 72 hours (72h ago, onward)      None            Interpretation of levels and current regimen:  Vancomycin level is reflective of AUC less than 400    Has serum creatinine changed greater than 50% in last 72 hours: No    Urine output:  good urine output    Renal Function: Stable    [employer] Prediction of Planned New Vancomycin Regimen  Loading dose: N/A  Regimen: 1500 mg IV every 18 hours.  Start time: 06:32 on 12/23/2024  Exposure target: AUC24 (range)400-600 mg/L.hr   AUC24,ss: 477 mg/L.hr  Probability of AUC24 > 400: 88 %  Ctrough,ss: 12.6 mg/L  Probability of Ctrough,ss > 20: 5 %  Probability of nephrotoxicity (Lodise COLIN 2009): 8 %      Plan:  Increase Dose to 1500 mg IV q18H  Vancomycin monitoring method: AUC  Vancomycin therapeutic monitoring goal: 400-600 mg*h/L  Pharmacy will check vancomycin levels as appropriate in 1-3 Days.  Serum creatinine levels will be ordered daily for the first week of therapy and at least twice weekly for subsequent weeks.    Clay Ribera [facility]

## 2024-12-23 ENCOUNTER — APPOINTMENT (OUTPATIENT)
Dept: CT IMAGING | Facility: CLINIC | Age: 74
End: 2024-12-23
Attending: NURSE PRACTITIONER
Payer: COMMERCIAL

## 2024-12-23 ENCOUNTER — APPOINTMENT (OUTPATIENT)
Dept: PHYSICAL THERAPY | Facility: CLINIC | Age: 74
DRG: 330 | End: 2024-12-23
Attending: TRANSPLANT SURGERY
Payer: COMMERCIAL

## 2024-12-23 ENCOUNTER — DOCUMENTATION ONLY (OUTPATIENT)
Dept: OTHER | Facility: CLINIC | Age: 74
End: 2024-12-23
Payer: COMMERCIAL

## 2024-12-23 LAB
ALBUMIN SERPL BCG-MCNC: 2.7 G/DL (ref 3.5–5.2)
ALP SERPL-CCNC: 61 U/L (ref 40–150)
ALT SERPL W P-5'-P-CCNC: <5 U/L (ref 0–70)
ANION GAP SERPL CALCULATED.3IONS-SCNC: 10 MMOL/L (ref 7–15)
AST SERPL W P-5'-P-CCNC: 14 U/L (ref 0–45)
BILIRUB SERPL-MCNC: 0.5 MG/DL
BUN SERPL-MCNC: 14.4 MG/DL (ref 8–23)
CALCIUM SERPL-MCNC: 8.5 MG/DL (ref 8.8–10.4)
CHLORIDE SERPL-SCNC: 105 MMOL/L (ref 98–107)
CREAT SERPL-MCNC: 0.8 MG/DL (ref 0.67–1.17)
EGFRCR SERPLBLD CKD-EPI 2021: >90 ML/MIN/1.73M2
ERYTHROCYTE [DISTWIDTH] IN BLOOD BY AUTOMATED COUNT: 16.4 % (ref 10–15)
GLUCOSE BLDC GLUCOMTR-MCNC: 104 MG/DL (ref 70–99)
GLUCOSE BLDC GLUCOMTR-MCNC: 121 MG/DL (ref 70–99)
GLUCOSE SERPL-MCNC: 110 MG/DL (ref 70–99)
HCO3 SERPL-SCNC: 20 MMOL/L (ref 22–29)
HCT VFR BLD AUTO: 28.1 % (ref 40–53)
HGB BLD-MCNC: 8.4 G/DL (ref 13.3–17.7)
INR PPP: 1.3 (ref 0.85–1.15)
MAGNESIUM SERPL-MCNC: 2 MG/DL (ref 1.7–2.3)
MCH RBC QN AUTO: 23.7 PG (ref 26.5–33)
MCHC RBC AUTO-ENTMCNC: 29.9 G/DL (ref 31.5–36.5)
MCV RBC AUTO: 79 FL (ref 78–100)
PHOSPHATE SERPL-MCNC: 3.5 MG/DL (ref 2.5–4.5)
PLATELET # BLD AUTO: 175 10E3/UL (ref 150–450)
POTASSIUM SERPL-SCNC: 3.9 MMOL/L (ref 3.4–5.3)
PREALB SERPL-MCNC: 5.6 MG/DL (ref 20–40)
PROT SERPL-MCNC: 6.6 G/DL (ref 6.4–8.3)
RBC # BLD AUTO: 3.54 10E6/UL (ref 4.4–5.9)
SODIUM SERPL-SCNC: 135 MMOL/L (ref 135–145)
WBC # BLD AUTO: 5 10E3/UL (ref 4–11)

## 2024-12-23 PROCEDURE — 84100 ASSAY OF PHOSPHORUS: CPT | Performed by: TRANSPLANT SURGERY

## 2024-12-23 PROCEDURE — 83735 ASSAY OF MAGNESIUM: CPT | Performed by: TRANSPLANT SURGERY

## 2024-12-23 PROCEDURE — 85610 PROTHROMBIN TIME: CPT | Performed by: PHYSICIAN ASSISTANT

## 2024-12-23 PROCEDURE — 85014 HEMATOCRIT: CPT | Performed by: STUDENT IN AN ORGANIZED HEALTH CARE EDUCATION/TRAINING PROGRAM

## 2024-12-23 PROCEDURE — 80053 COMPREHEN METABOLIC PANEL: CPT | Performed by: STUDENT IN AN ORGANIZED HEALTH CARE EDUCATION/TRAINING PROGRAM

## 2024-12-23 PROCEDURE — 250N000011 HC RX IP 250 OP 636: Performed by: NURSE PRACTITIONER

## 2024-12-23 PROCEDURE — 120N000011 HC R&B TRANSPLANT UMMC

## 2024-12-23 PROCEDURE — 250N000013 HC RX MED GY IP 250 OP 250 PS 637: Performed by: STUDENT IN AN ORGANIZED HEALTH CARE EDUCATION/TRAINING PROGRAM

## 2024-12-23 PROCEDURE — 250N000013 HC RX MED GY IP 250 OP 250 PS 637: Performed by: PHYSICIAN ASSISTANT

## 2024-12-23 PROCEDURE — 74177 CT ABD & PELVIS W/CONTRAST: CPT | Mod: 26 | Performed by: RADIOLOGY

## 2024-12-23 PROCEDURE — 250N000013 HC RX MED GY IP 250 OP 250 PS 637: Performed by: TRANSPLANT SURGERY

## 2024-12-23 PROCEDURE — 250N000011 HC RX IP 250 OP 636: Performed by: TRANSPLANT SURGERY

## 2024-12-23 PROCEDURE — 250N000011 HC RX IP 250 OP 636: Performed by: PHYSICIAN ASSISTANT

## 2024-12-23 PROCEDURE — 36415 COLL VENOUS BLD VENIPUNCTURE: CPT | Performed by: STUDENT IN AN ORGANIZED HEALTH CARE EDUCATION/TRAINING PROGRAM

## 2024-12-23 PROCEDURE — 74177 CT ABD & PELVIS W/CONTRAST: CPT

## 2024-12-23 PROCEDURE — 97530 THERAPEUTIC ACTIVITIES: CPT | Mod: GP | Performed by: REHABILITATION PRACTITIONER

## 2024-12-23 PROCEDURE — 250N000013 HC RX MED GY IP 250 OP 250 PS 637: Performed by: NURSE PRACTITIONER

## 2024-12-23 PROCEDURE — 84134 ASSAY OF PREALBUMIN: CPT | Performed by: TRANSPLANT SURGERY

## 2024-12-23 RX ORDER — IOPAMIDOL 755 MG/ML
82 INJECTION, SOLUTION INTRAVASCULAR ONCE
Status: COMPLETED | OUTPATIENT
Start: 2024-12-23 | End: 2024-12-23

## 2024-12-23 RX ORDER — CIPROFLOXACIN 500 MG/1
500 TABLET, FILM COATED ORAL EVERY 12 HOURS SCHEDULED
Status: DISCONTINUED | OUTPATIENT
Start: 2024-12-23 | End: 2024-12-24 | Stop reason: HOSPADM

## 2024-12-23 RX ORDER — PANTOPRAZOLE SODIUM 40 MG/1
40 TABLET, DELAYED RELEASE ORAL EVERY EVENING
Status: DISCONTINUED | OUTPATIENT
Start: 2024-12-23 | End: 2024-12-24 | Stop reason: HOSPADM

## 2024-12-23 RX ORDER — WARFARIN SODIUM 4 MG/1
4 TABLET ORAL
Status: COMPLETED | OUTPATIENT
Start: 2024-12-23 | End: 2024-12-23

## 2024-12-23 RX ADMIN — ACETAMINOPHEN 650 MG: 325 TABLET, FILM COATED ORAL at 12:20

## 2024-12-23 RX ADMIN — PANTOPRAZOLE SODIUM 40 MG: 40 TABLET, DELAYED RELEASE ORAL at 19:43

## 2024-12-23 RX ADMIN — FLUCONAZOLE 400 MG: 200 TABLET ORAL at 09:01

## 2024-12-23 RX ADMIN — RIFAXIMIN 550 MG: 550 TABLET ORAL at 09:01

## 2024-12-23 RX ADMIN — PIPERACILLIN SODIUM AND TAZOBACTAM SODIUM 3.38 G: 3; .375 INJECTION, SOLUTION INTRAVENOUS at 16:07

## 2024-12-23 RX ADMIN — CIPROFLOXACIN 500 MG: 500 TABLET ORAL at 19:43

## 2024-12-23 RX ADMIN — Medication 1500 MG: at 00:39

## 2024-12-23 RX ADMIN — IOPAMIDOL 82 ML: 755 INJECTION, SOLUTION INTRAVENOUS at 14:44

## 2024-12-23 RX ADMIN — PIPERACILLIN SODIUM AND TAZOBACTAM SODIUM 3.38 G: 3; .375 INJECTION, SOLUTION INTRAVENOUS at 02:34

## 2024-12-23 RX ADMIN — HEPARIN SODIUM 5000 UNITS: 5000 INJECTION, SOLUTION INTRAVENOUS; SUBCUTANEOUS at 18:01

## 2024-12-23 RX ADMIN — ACETAMINOPHEN 650 MG: 325 TABLET, FILM COATED ORAL at 05:28

## 2024-12-23 RX ADMIN — HEPARIN SODIUM 5000 UNITS: 5000 INJECTION, SOLUTION INTRAVENOUS; SUBCUTANEOUS at 06:53

## 2024-12-23 RX ADMIN — WARFARIN SODIUM 4 MG: 4 TABLET ORAL at 18:01

## 2024-12-23 RX ADMIN — RIFAXIMIN 550 MG: 550 TABLET ORAL at 19:43

## 2024-12-23 RX ADMIN — ACETAMINOPHEN 975 MG: 325 TABLET, FILM COATED ORAL at 19:43

## 2024-12-23 RX ADMIN — PIPERACILLIN SODIUM AND TAZOBACTAM SODIUM 3.38 G: 3; .375 INJECTION, SOLUTION INTRAVENOUS at 09:01

## 2024-12-23 NOTE — PLAN OF CARE
Goal Outcome Evaluation:      Plan of Care Reviewed With: patient    Overall Patient Progress: improvingOverall Patient Progress: improving  [id number]    Neuro:  Pt. alert and Ox4  Behavior:  Pt. pleasant and cooperative with cares.    Activity:  Pt. up ad kailyn.    Vital:  AVSS on RA.   BG: N/A  LDAs:  Right PIV with PPN and Lipids.  SEGUNDO with scant serous drainage.  Cardiac: WNL  Respiratory: LS clear bilat.   GI/: Pt. voiding without difficulty.  No stools this shift.  Pt. had BM today.    Skin:  LLQ incision stapled and JACK.    Pain/Nausea:  Abdominal pain managed with sched. Tylenol.  Pt. denies nausea.  Diet: Clear liquids  Labs/Imaging:  See chart for results.    Plan:  Continue to follow POC and notify team with change in status.

## 2024-12-23 NOTE — PLAN OF CARE
Goal Outcome Evaluation:      Plan of Care Reviewed With: patient    Overall Patient Progress: no changeOverall Patient Progress: no change       Neuro:  A/Ox4  Vital: VSS   Cardiac: WNL  Respiratory: RA, LS clear  GI/: voiding without difficulty.  X2 stools this shift. Denies nausea.  Skin:  LLQ incision stapled and JACK.    Activity:  up ad kailyn.    BG: Q6hr  LDAs:  Right PIV with (PPN Hold per pt. request)  Pain/Nausea:  Abdominal pain managed with sched. Tylenol.    Diet: Clear liquids  Labs/Imaging:  See chart for results.    Plan:  CT scan done 12/23, Continue to follow POC.

## 2024-12-23 NOTE — PLAN OF CARE
"Goal Outcome Evaluation:      Plan of Care Reviewed With: patient    Overall Patient Progress: no changeOverall Patient Progress: no change    Outcome Evaluation: AVVS on RA, pain well controlled, small BM x1 overnight. Gastrograffin/abdominal CT today.    /59 (BP Location: Right arm)   Pulse 64   Temp 97.8  F (36.6  C) (Oral)   Resp 16   Ht 1.702 m (5' 7\")   Wt 61.3 kg (135 lb 1.6 oz)   SpO2 98%   BMI 21.16 kg/m      Shift: 5622-5692  Isolation Status: standard  VS: stable on RA, afebrile  Neuro: A&O x4  Behaviors: receptive to cares  BG: Q6: 104  Labs/Imaging: Calcium 8.6, Hgb 8.8; pending AM labs  Respiratory: diminished sounds in LLs  Cardiac: WDL  Pain/Nausea: Denied nausea. C/O mild abdominal pain  PRN: N/A  Diet: clear liquids  LDA: R PIV, L SEGUNDO with scant serosang output  Infusion(s): continuous PPN with lipids  GI/: BM x1, small and loose. Voiding spontaneously, AUO, usually uses bedside urinal  Skin: LLQ incision stapled and JACK, scant bloody and tan dried drainage  Mobility: SBA  Plan: Gastrograffin and abdominal CT today. Notify MD of any significant changes, continue plan of care. Hand off to be given to oncoming RN.   "

## 2024-12-23 NOTE — PROGRESS NOTES
Transplant Surgery  Inpatient Daily Progress Note  12/23/2024    Assessment & Plan: 74 year old male with history of left inguinal hernia. Past medical history of interstitial ling disease, recurrent PE on warfarin, alcohol associated cirrhosis, portal hypertension s/p TIPS, and gout. Patient is status post left inguinal hernia and repair of sigmoid colon enterotomy secondary to left inguinal hernia, sliding with chronically incarcerated sigmoid colon, with a small abscess on 12/19/24. Admitted for monitoring, PIV nutrition, and follow up gastrograffin study on 12/23.      Today:  - Pharmacy managing warfarin. Continue heparin subcutaneous  - CT scan abd/pelvis with IV and rectal contrast today  - Ok for full liquid diet pending CT results                                                                                                                             S/p left inguinal hernia repair complicated by colon enteromy s/p repair: POD 4   SEGUNDO serosanguinous output. Fluid culture no growth, prelim. Continue antibiotics. Monitor for infection.   -Plan for CT scan with IV/rectal  contrast on Monday per surgeon.   -hold bowel regimen including dulcolax supp   - Can pull SEGUNDO drain tomorrow (12/24)    GI/Nutrition:   Diet: CLD. PPN continue until after CT scan reviewed  - Full liquid diet pending CT results. Per Dr. Dominguez would like full liquid diet x 5 days on discharge  Cirrhosis, portal hypertension with hx TIPS: MELD Na 8.  PTA amiloride, lasix, lactulose, rifaximin. Restarted rifaximin, amiloride, and lasix. Hold lactulose.  .     Cardiorespiratory:   Low BP at baseline: SBP 90s-110s. HR 80s. Stable. Monitor    Hematology: stable  Hx PE: PTA on warfarin, prior to surgery lovenox to bridge. Consulted Hematology to evaluate if patient appropriate to start on DOAC for prevention recurrent PE. Hematology recommended to continue warfarin.   -Started warfarin 12/21  -Continue heparin subcutaneous ppx  dosing    Access: PIV    Fluid/Electrolytes: Replete electrolytes PRN    Endocrine: No issues    Rheum:  Gout: PTA allopurinol. Continue allopurinol    : Vo removed. PVR negative. Stop Flomax, no indication     Infectious disease: afebrile. WBC WNL. SEGUNDO serosanguinous output.   -Continue surgical ppx with Zosyn, Vanco, and diflucan due to colon enterotomy. Continue abx until CT abd/pelvis scan reviewed.   -12/19 abd fluid culture no growth, prelim  - Transition to Cipro x 5 days on discharge    Prophylaxis: DVT/PE: heparin subcutaneous, start warfarin. GI - PPI    Activity: Up ad kailyn. Ambulate QID. PT consult    Disposition: 7A. Plan for discharge tomorrow pending CT results        NAA/Fellow/Resident Provider: UMAIR Joel CNP, paged [phone]    Faculty: Dr. Timothy Peña  __________________________________________________________________  Transplant History: Admitted 12/19/2024 for monitoring s/p colon enterotomy.  N/A, Postoperative day:      Interval History: History is obtained from the patient  Overnight events: No acute events overnight. Pain controlled. Endorses no specific complaints. Minimal output from SEGUNDO drain.     ROS:   A 10-point review of systems was negative except as noted above.    Meds:  Current Facility-Administered Medications   Medication Dose Route Frequency Provider Last Rate Last Admin    acetaminophen (TYLENOL) tablet 975 mg  975 mg Oral Q8H Rohan Mackey MD   650 mg at 12/23/24 1220    fluconazole (DIFLUCAN) tablet 400 mg  400 mg Oral Daily Hilary Werner PA-C   400 mg at 12/23/24 0901    heparin ANTICOAGULANT injection 5,000 Units  5,000 Units Subcutaneous Q12H Hilary Werner PA-C   5,000 Units at 12/23/24 0653    lipids plant base (CLINOLIPID) 20 % infusion 250 mL  250 mL Intravenous Q24H Russ Dominguez MD 20.8 mL/hr at 12/22/24 2053 250 mL at 12/22/24 2053    pantoprazole (PROTONIX) EC tablet 40 mg  40 mg Oral QPM Russ Dominguez MD         "piperacillin-tazobactam (ZOSYN) intermittent infusion 3.375 g  3.375 g Intravenous Q6H Russ Dominguez  mL/hr at 12/23/24 0901 3.375 g at 12/23/24 0901    polyethylene glycol (MIRALAX) Packet 17 g  17 g Oral Daily Rohan Mackey MD   17 g at 12/21/24 0835    rifaximin (XIFAXAN) tablet 550 mg  550 mg Oral BID Hilary Werner PA-C   550 mg at 12/23/24 0901    senna-docusate (SENOKOT-S/PERICOLACE) 8.6-50 MG per tablet 1 tablet  1 tablet Oral BID Rohan Mackey MD   1 tablet at 12/21/24 0835    sodium chloride (PF) 0.9% PF flush 3 mL  3 mL Intracatheter Q8H Rohan Mackey MD   3 mL at 12/23/24 0901    vancomycin (VANCOCIN) 1,500 mg in 0.9% NaCl 250 mL intermittent infusion  1,500 mg Intravenous Q18H Russ Dominguez .7 mL/hr at 12/23/24 0039 1,500 mg at 12/23/24 0039    warfarin ANTICOAGULANT (COUMADIN) tablet 4 mg  4 mg Oral ONCE at 18:00 Russ Dominguez MD        Warfarin Dose Required Daily - Pharmacist Managed  1 each Oral See Admin Instructions Hilary Werner PA-C           Physical Exam:     Admit Weight: 60.1 kg (132 lb 7.9 oz)    Current vitals:   BP 98/59 (BP Location: Left arm)   Pulse 60   Temp 97.9  F (36.6  C) (Oral)   Resp 16   Ht 1.702 m (5' 7\")   Wt 61.3 kg (135 lb 1.6 oz)   SpO2 98%   BMI 21.16 kg/m           Vital sign ranges:    Temp:  [97.7  F (36.5  C)-98  F (36.7  C)] 97.9  F (36.6  C)  Pulse:  [60-72] 60  Resp:  [16-18] 16  BP: ()/(55-66) 98/59  SpO2:  [98 %] 98 %  Patient Vitals for the past 24 hrs:   BP Temp Temp src Pulse Resp SpO2 Weight   12/23/24 0949 98/59 97.9  F (36.6  C) Oral 60 16 98 % --   12/23/24 0603 112/64 97.9  F (36.6  C) Oral 63 16 98 % --   12/23/24 0131 109/59 97.8  F (36.6  C) Oral 64 16 98 % --   12/23/24 0004 -- -- -- -- -- -- 61.3 kg (135 lb 1.6 oz)   12/22/24 2059 110/55 98  F (36.7  C) Oral 68 16 98 % --   12/22/24 1755 126/66 97.7  F (36.5  C) Oral 72 18 98 % --     General Appearance: in no apparent distress.   Skin: " normal, warm  Heart: well perfused  Lungs: Nonlabored resps on RA  Abdomen: The abdomen is soft, nontender  : left groin incision c/d/I. SEGUNDO serosanguinous output. blount is present.   Extremities: edema: absent.   Neurologic: awake, alert and oriented.       Data:   CMP  Recent Labs   Lab 12/23/24  0610 12/23/24  0128 12/22/24  1205 12/22/24  0645     --   --  136   POTASSIUM 3.9  --   --  4.2   CHLORIDE 105  --   --  104   CO2 20*  --   --  22   * 104*   < > 108*   BUN 14.4  --   --  18.9   CR 0.80  --   --  0.83   GFRESTIMATED >90  --   --  >90   CLARIBEL 8.5*  --   --  8.6*   MAG 2.0  --   --  2.2   PHOS 3.5  --   --  3.7   ALBUMIN 2.7*  --   --  2.8*   BILITOTAL 0.5  --   --  0.5   ALKPHOS 61  --   --  58   AST 14  --   --  15   ALT <5  --   --  <5    < > = values in this interval not displayed.     CBC  Recent Labs   Lab 12/23/24  0610 12/22/24  0645   HGB 8.4* 8.8*   WBC 5.0 5.8    170     COAGS  Recent Labs   Lab 12/23/24  0610 12/22/24  0645   INR 1.30* 1.33*      Urinalysis  No lab results found.

## 2024-12-24 ENCOUNTER — APPOINTMENT (OUTPATIENT)
Dept: SPEECH THERAPY | Facility: CLINIC | Age: 74
DRG: 330 | End: 2024-12-24
Attending: TRANSPLANT SURGERY
Payer: COMMERCIAL

## 2024-12-24 ENCOUNTER — APPOINTMENT (OUTPATIENT)
Dept: PHYSICAL THERAPY | Facility: CLINIC | Age: 74
DRG: 330 | End: 2024-12-24
Attending: TRANSPLANT SURGERY
Payer: COMMERCIAL

## 2024-12-24 VITALS
OXYGEN SATURATION: 97 % | RESPIRATION RATE: 16 BRPM | HEIGHT: 67 IN | DIASTOLIC BLOOD PRESSURE: 63 MMHG | HEART RATE: 77 BPM | SYSTOLIC BLOOD PRESSURE: 118 MMHG | TEMPERATURE: 97.5 F | WEIGHT: 135.1 LBS | BODY MASS INDEX: 21.2 KG/M2

## 2024-12-24 PROBLEM — Z87.19 S/P LEFT INGUINAL HERNIA REPAIR: Status: ACTIVE | Noted: 2024-12-24

## 2024-12-24 PROBLEM — G89.18 ACUTE POST-OPERATIVE PAIN: Status: ACTIVE | Noted: 2024-12-24

## 2024-12-24 PROBLEM — Z98.890 S/P LEFT INGUINAL HERNIA REPAIR: Status: ACTIVE | Noted: 2024-12-24

## 2024-12-24 LAB
ALBUMIN SERPL BCG-MCNC: 2.8 G/DL (ref 3.5–5.2)
ALP SERPL-CCNC: 67 U/L (ref 40–150)
ALT SERPL W P-5'-P-CCNC: <5 U/L (ref 0–70)
ANION GAP SERPL CALCULATED.3IONS-SCNC: 9 MMOL/L (ref 7–15)
AST SERPL W P-5'-P-CCNC: 18 U/L (ref 0–45)
BILIRUB SERPL-MCNC: 0.3 MG/DL
BUN SERPL-MCNC: 12.4 MG/DL (ref 8–23)
CALCIUM SERPL-MCNC: 8.7 MG/DL (ref 8.8–10.4)
CHLORIDE SERPL-SCNC: 107 MMOL/L (ref 98–107)
CREAT SERPL-MCNC: 0.78 MG/DL (ref 0.67–1.17)
EGFRCR SERPLBLD CKD-EPI 2021: >90 ML/MIN/1.73M2
ERYTHROCYTE [DISTWIDTH] IN BLOOD BY AUTOMATED COUNT: 16.5 % (ref 10–15)
GLUCOSE BLDC GLUCOMTR-MCNC: 98 MG/DL (ref 70–99)
GLUCOSE SERPL-MCNC: 97 MG/DL (ref 70–99)
HCO3 SERPL-SCNC: 22 MMOL/L (ref 22–29)
HCT VFR BLD AUTO: 26.9 % (ref 40–53)
HGB BLD-MCNC: 8.4 G/DL (ref 13.3–17.7)
INR PPP: 1.43 (ref 0.85–1.15)
MCH RBC QN AUTO: 24.2 PG (ref 26.5–33)
MCHC RBC AUTO-ENTMCNC: 31.2 G/DL (ref 31.5–36.5)
MCV RBC AUTO: 78 FL (ref 78–100)
PATH REPORT.COMMENTS IMP SPEC: NORMAL
PATH REPORT.COMMENTS IMP SPEC: NORMAL
PATH REPORT.FINAL DX SPEC: NORMAL
PATH REPORT.GROSS SPEC: NORMAL
PATH REPORT.MICROSCOPIC SPEC OTHER STN: NORMAL
PATH REPORT.RELEVANT HX SPEC: NORMAL
PHOTO IMAGE: NORMAL
PLATELET # BLD AUTO: 176 10E3/UL (ref 150–450)
POTASSIUM SERPL-SCNC: 4.1 MMOL/L (ref 3.4–5.3)
PROT SERPL-MCNC: 6.7 G/DL (ref 6.4–8.3)
RBC # BLD AUTO: 3.47 10E6/UL (ref 4.4–5.9)
SODIUM SERPL-SCNC: 138 MMOL/L (ref 135–145)
WBC # BLD AUTO: 4 10E3/UL (ref 4–11)

## 2024-12-24 PROCEDURE — 85027 COMPLETE CBC AUTOMATED: CPT | Performed by: STUDENT IN AN ORGANIZED HEALTH CARE EDUCATION/TRAINING PROGRAM

## 2024-12-24 PROCEDURE — 92526 ORAL FUNCTION THERAPY: CPT | Mod: GN

## 2024-12-24 PROCEDURE — 82374 ASSAY BLOOD CARBON DIOXIDE: CPT | Performed by: STUDENT IN AN ORGANIZED HEALTH CARE EDUCATION/TRAINING PROGRAM

## 2024-12-24 PROCEDURE — 97110 THERAPEUTIC EXERCISES: CPT | Mod: GP

## 2024-12-24 PROCEDURE — 84075 ASSAY ALKALINE PHOSPHATASE: CPT | Performed by: STUDENT IN AN ORGANIZED HEALTH CARE EDUCATION/TRAINING PROGRAM

## 2024-12-24 PROCEDURE — 250N000013 HC RX MED GY IP 250 OP 250 PS 637: Performed by: NURSE PRACTITIONER

## 2024-12-24 PROCEDURE — 85610 PROTHROMBIN TIME: CPT | Performed by: PHYSICIAN ASSISTANT

## 2024-12-24 PROCEDURE — 250N000013 HC RX MED GY IP 250 OP 250 PS 637: Performed by: STUDENT IN AN ORGANIZED HEALTH CARE EDUCATION/TRAINING PROGRAM

## 2024-12-24 PROCEDURE — 36415 COLL VENOUS BLD VENIPUNCTURE: CPT | Performed by: STUDENT IN AN ORGANIZED HEALTH CARE EDUCATION/TRAINING PROGRAM

## 2024-12-24 PROCEDURE — 250N000011 HC RX IP 250 OP 636: Performed by: PHYSICIAN ASSISTANT

## 2024-12-24 PROCEDURE — 97530 THERAPEUTIC ACTIVITIES: CPT | Mod: GP

## 2024-12-24 PROCEDURE — 250N000013 HC RX MED GY IP 250 OP 250 PS 637: Performed by: PHYSICIAN ASSISTANT

## 2024-12-24 RX ORDER — PANTOPRAZOLE SODIUM 40 MG/1
40 TABLET, DELAYED RELEASE ORAL EVERY EVENING
Qty: 14 TABLET | Refills: 0 | Status: SHIPPED | OUTPATIENT
Start: 2024-12-24 | End: 2025-01-07

## 2024-12-24 RX ORDER — CIPROFLOXACIN 500 MG/1
500 TABLET, FILM COATED ORAL EVERY 12 HOURS
Qty: 10 TABLET | Refills: 0 | Status: ACTIVE | OUTPATIENT
Start: 2024-12-24 | End: 2024-12-29

## 2024-12-24 RX ORDER — WARFARIN SODIUM 4 MG/1
4 TABLET ORAL
Status: DISCONTINUED | OUTPATIENT
Start: 2024-12-24 | End: 2024-12-24 | Stop reason: HOSPADM

## 2024-12-24 RX ORDER — WARFARIN SODIUM 1 MG/1
4 TABLET ORAL EVERY EVENING
Status: CANCELLED
Start: 2024-12-24

## 2024-12-24 RX ORDER — WARFARIN SODIUM 1 MG/1
4 TABLET ORAL EVERY EVENING
Status: ACTIVE
Start: 2024-12-24

## 2024-12-24 RX ORDER — ENOXAPARIN SODIUM 100 MG/ML
60 INJECTION SUBCUTANEOUS 2 TIMES DAILY
Status: SHIPPED
Start: 2024-12-24 | End: 2024-12-26

## 2024-12-24 RX ADMIN — ACETAMINOPHEN 975 MG: 325 TABLET, FILM COATED ORAL at 07:57

## 2024-12-24 RX ADMIN — HEPARIN SODIUM 5000 UNITS: 5000 INJECTION, SOLUTION INTRAVENOUS; SUBCUTANEOUS at 08:05

## 2024-12-24 RX ADMIN — CIPROFLOXACIN 500 MG: 500 TABLET ORAL at 07:58

## 2024-12-24 RX ADMIN — RIFAXIMIN 550 MG: 550 TABLET ORAL at 07:58

## 2024-12-24 ASSESSMENT — ACTIVITIES OF DAILY LIVING (ADL)
ADLS_ACUITY_SCORE: 34

## 2024-12-24 NOTE — PLAN OF CARE
"Goal Outcome Evaluation:    /57 (BP Location: Right arm)   Pulse 69   Temp 98.5  F (36.9  C) (Oral)   Resp 16   Ht 1.702 m (5' 7\")   Wt 61.3 kg (135 lb 1.6 oz)   SpO2 97%   BMI 21.16 kg/m      Shift: 4736-9411    VS: VSS  Neuro: Aox4  Cardio: WDL  Respiratory: WDL on RA  GI: LBM 12/23  : Voiding adequately  Skin: LLQ abd incision stapled with primapore  Diet: Full liquid  BG: q6hr  LDA: R PIV removed @0500. Pt refusing another one unless he knows he's staying another night  Infusions: none - refused TPN and lipids  Mobility: UAL   Pain/Nausea: minimal incisional pain  PRN medications: none given  "

## 2024-12-24 NOTE — PLAN OF CARE
Speech Language Therapy Discharge Summary    Reason for therapy discharge:    All goals and outcomes met, no further needs identified.    Progress towards therapy goal(s). See goals on Care Plan in [facility] electronic health record for goal details.  Goals met  Pt's provider reported pt to remain on full liquids diet d/t colon.     Therapy recommendation(s):    No further therapy is recommended.    Continue full liquids diet (thin consistency)

## 2024-12-24 NOTE — DISCHARGE SUMMARY
[facility]    Discharge Summary  Transplant Surgery    Date of Admission:  12/19/2024  Date of Discharge:  12/24/2024  Discharging Provider: Morenita Hubbard NP / Timothy Peña MD    Discharge Diagnoses   Active Problems:    Alcoholic cirrhosis of liver without ascites (H)    S/P left inguinal hernia repair    Acute post-operative pain    Procedure/Surgery Information   Procedure: Procedure(s):  Repair of sliding  inguinal hernia. Repair of sigmoid colon enterotomy.   Surgeon(s): Surgeons and Role:     * Russ Dominguez MD - Primary     * Rohan Mackey MD - Fellow - Assisting       Non-operative procedures None performed     History of Present Illness   Les Morel is a 74 year old male with history of left inguinal hernia, interstitial ling disease, recurrent PE on warfarin, alcohol associated cirrhosis, portal hypertension s/p TIPS, and gout. Patient is status post left inguinal hernia and repair of sigmoid colon enterotomy secondary to left inguinal hernia, sliding with chronically incarcerated sigmoid colon, with a small abscess on 12/19/24. Admitted for monitoring, PIV nutrition, and follow up gastrograffin study on 12/23.      Hospital Course   Les Morel was admitted on 12/19/2024.  The following problems were addressed during his hospitalization:    s/p Left inguinal hernia repair c/b colon enterotomy s/p repair 12/19/24: POD#5. Was started on TPN and IV antibiotics with strict NPO status initially post-op. NG was removed and patient was started on clear liquids. CT on 12/23 with no contrast extravasation nor free air to indicate sigmoid perforation after repair. Received four days of Vanc/Zosyn/fluconazole and transitioned to PO Cipro. Remains afebrile, WBC WNL. Incision healing well. Pain controlled. Denies N/V, tolerating full liquids. Passing gas and having BMs. Up ad kailyn.    - Continue Cipro x 5 days.    - Continue full liquid diet x 5  days, then ok for Regular diet.    - Follow up with Dr. Dominguez in clinic on 1/6/24.     Acute post-op pain: Pain well controlled with PRN Tylenol.     Hx Cirrhosis, Portal HTN with ascites s/p TIPS: Restart PTA regimen on discharge.     Hx PE: PTA on warfarin, prior to surgery lovenox to bridge. Consulted Hematology to evaluate if patient appropriate to start on DOAC for prevention recurrent PE. Hematology recommended to continue warfarin which was restarted on 12/21.    - Restart warfarin 4 mg daily.    - Check INR on 12/26/24.    - Bridge with Lovenox x 2 days until INR recheck.         Discharge Disposition   Discharged to home   Condition at discharge: Stable    Pending Results   These results will be followed up by Transplant team  Unresulted Labs Ordered in the Past 30 Days of this Admission       Date and Time Order Name Status Description    12/19/2024  1:30 PM Fungal or Yeast Culture Routine Preliminary     12/19/2024  1:30 PM Anaerobic Bacterial Culture Routine Preliminary           Final pathology results:   Case Report   Surgical Pathology Report                         Case: [id number]                                   Authorizing Provider:  Russ Dominguez MD   Collected:           12/19/2024 02:13 PM           Ordering Location:     [facility] OR                 Received:            12/19/2024 03:04 PM           Pathologist:           Radha Joel MD                                                             Specimen:    Hernia Sac, Inguinal, Left, hernia sac                                                    Final Diagnosis   Hernia sac, inguinal, left, repair:  - Peritonealized fibroadipose tissue granulation tissue formation, extensive fibrin deposition consistent with incarcerated hernia sac  - Segment of unremarkable vas deferens identified     Primary Care Physician   [facility]    Physical Exam   Temp: 97.9  F (36.6  C) Temp src: Oral BP: 100/56 Pulse: 69   Resp: 16 SpO2: 97 % O2 Device: None (Room air)    Vitals:    12/21/24 0600 12/22/24 0604 12/23/24 0004   Weight: 60.1 kg (132 lb 8 oz) 60.6 kg (133 lb 11.2 oz) 61.3 kg (135 lb 1.6 oz)     Vital Signs with Ranges  Temp:  [97.9  F (36.6  C)-98.5  F (36.9  C)] 97.9  F (36.6  C)  Pulse:  [68-69] 69  Resp:  [16] 16  BP: (100-118)/(54-60) 100/56  SpO2:  [81 %-99 %] 97 %  I/O last 3 completed shifts:  In: -   Out: 150 [Urine:150]    Constitutional: in no distress  Eyes: EOMI; corrective lenses in place  Respiratory: unlabored on RA  Cardiovascular: perfused  GI: abdomen flat, soft. Incision closed with staples and open to air. SEGUNDO drain removed.  Genitourinary: no Vo; UOP clear lashonda  Skin: warm, dry  Musculoskeletal: no edema  Neurologic: A&Ox4  Neuropsychiatric: calm, pleasant, funny    Consultations This Hospital Stay   PHARMACY/NUTRITION TO START AND MANAGE TPN  PHARMACY TO DOSE VANCO  HEMATOLOGY ADULT IP CONSULT  SOCIAL WORK IP CONSULT  PHYSICAL THERAPY ADULT IP CONSULT  SPEECH LANGUAGE PATH ADULT IP CONSULT  NURSING TO CONSULT FOR VASCULAR ACCESS CARE IP CONSULT  PHARMACY IP CONSULT  NURSING TO CONSULT FOR VASCULAR ACCESS CARE IP CONSULT  NURSING TO CONSULT FOR VASCULAR ACCESS CARE IP CONSULT  CARE MANAGEMENT / SOCIAL WORK IP CONSULT  PHARMACY TO DOSE WARFARIN  NURSING TO CONSULT FOR VASCULAR ACCESS CARE IP CONSULT  CARE MANAGEMENT / SOCIAL WORK IP CONSULT  NURSING TO CONSULT FOR VASCULAR ACCESS CARE IP CONSULT  NURSING TO CONSULT FOR VASCULAR ACCESS CARE IP CONSULT  NURSING TO CONSULT FOR VASCULAR ACCESS CARE IP CONSULT    Time Spent on this Encounter   I have spent greater than 30 minutes on this discharge.    Discharge Orders   Discharge Medications   Current Discharge Medication List        START taking these medications    Details   ciprofloxacin (CIPRO) 500 MG tablet Take 1 tablet (500 mg) by mouth every 12 hours  for 5 days.  Qty: 10 tablet, Refills: 0    Associated Diagnoses: S/P left inguinal hernia repair      pantoprazole (PROTONIX) 40 MG EC tablet Take 1 tablet (40 mg) by mouth every evening for 14 days.  Qty: 14 tablet, Refills: 0    Associated Diagnoses: S/P left inguinal hernia repair           CONTINUE these medications which have CHANGED    Details   enoxaparin ANTICOAGULANT (LOVENOX) 60 MG/0.6ML syringe Inject 0.6 mLs (60 mg) subcutaneously 2 times daily for 2 days.    Associated Diagnoses: Recurrent pulmonary emboli (H)      warfarin ANTICOAGULANT (COUMADIN) 1 MG tablet Take 4 tablets (4 mg) by mouth every evening.    Associated Diagnoses: Recurrent pulmonary emboli (H)           CONTINUE these medications which have NOT CHANGED    Details   acetaminophen (TYLENOL) 500 MG tablet Take 500 mg by mouth every 6 hours as needed for pain.      allopurinol (ZYLOPRIM) 300 MG tablet Take 300 mg by mouth twice a week.      aMILoride (MIDAMOR) 5 MG tablet Take 10 mg by mouth every morning.      Ferrous Sulfate 324 (65 Fe) MG TBEC Take 324 mg by mouth daily.      furosemide (LASIX) 20 MG tablet Take 60 mg by mouth every morning.      hydrocortisone (CORTIZONE 10) 1 % external lotion Apply topically daily as needed for itching.      lactulose (CHRONULAC) 10 GM/15ML solution Take 15 mLs by mouth daily.      polyethylene glycol (MIRALAX) 17 GM/Dose powder Take 0.5 Capfuls by mouth daily as needed for constipation.      potassium chloride lópez ER (KLOR-CON M20) 20 MEQ CR tablet Take 20 mEq by mouth daily as needed for potassium supplementation.      rifaximin (XIFAXAN) 550 MG TABS tablet Take 550 mg by mouth 2 times daily.                Reason for your hospital stay    You were admitted for a hernia repair c/b colon enterotomy and were admitted post-operatively for monitoring and IV antibiotics. You are now discharging home in stable condition with close follow up.     Activity    Your activity upon discharge: Walk at least  four times a day, lift no greater than 10 pounds for 8 weeks from the time of surgery.  No driving x 3 weeks after surgery.     [facility]ult [facility] Follow-up and recommended labs and tests    Please have INR drawn on 12/26/25, then resume previous lab schedule.     Follow up with Dr. Dominguez in clinic on 1/6/25.     When to contact your care team    WHEN TO CONTACT US:     Nurse Coordinator Emily Angel [phone]     Notify your coordinator if you have increased pain, increased redness or drainage from your incision, fever greater than 100F.       If you have URGENT concerns after office hours, please call the hospital switchboard at [phone] and ask to have the organ transplant nurse on-call paged. If you have a life-threatening emergency, go to the nearest emergency room.     Wound care and dressings    Instructions to care for your wound at home: Your staples will be removed in clinic. Wash incision daily with soap and water. Do not soak or scrub.     Diet    Follow this diet upon discharge: Continue full liquid diet x 5 days, then ok to resume regular diet. Please supplement with protein shakes.         Data   Most Recent 3 CBC's:  Recent Labs   Lab Test 12/24/24  0429 12/23/24  0610 12/22/24  0645   WBC 4.0 5.0 5.8   HGB 8.4* 8.4* 8.8*   MCV 78 79 79    175 170      Most Recent 3 BMP's:  Recent Labs   Lab Test 12/24/24  0429 12/24/24  0236 12/23/24  2029 12/23/24  0610 12/22/24  1205 12/22/24  0645     --   --  135  --  136   POTASSIUM 4.1  --   --  3.9  --  4.2   CHLORIDE 107  --   --  105  --  104   CO2 22  --   --  20*  --  22   BUN 12.4  --   --  14.4  --  18.9   CR 0.78  --   --  0.80  --  0.83   ANIONGAP 9  --   --  10  --  10   CLARIBEL 8.7*  --   --  8.5*  --  8.6*   GLC 97 98 121* 110*   < > 108*    < > = values in this interval not displayed.     Most Recent 2 LFT's:  Recent Labs   Lab Test 12/24/24  0429 12/23/24  0610   AST 18 14   ALT <5 <5   ALKPHOS 67 61   BILITOTAL 0.3 0.5      Most Recent INR's and Anticoagulation Dosing History:  Anticoagulation Dose History  More data may exist         Latest Ref Rng & Units 11/18/2024 12/19/2024 12/20/2024 12/21/2024 12/22/2024 12/23/2024 12/24/2024   Recent Dosing and Labs   warfarin ANTICOAGULANT (COUMADIN) 3 MG tablet - - - - 3 mg, $Given 3 mg, $Given - -   warfarin ANTICOAGULANT (COUMADIN) 4 MG tablet - - - - - - 4 mg, $Given -   INR 0.85 - 1.15 1.65  1.28  1.23  1.37  1.40  1.33  1.30  1.43       Details          Multiple values from one day are sorted in reverse-chronological order             Most Recent 3 Troponin's:No lab results found.  Most Recent Cholesterol Panel:No lab results found.  Most Recent 6 Bacteria Isolates From Any Culture (See EPIC Reports for Culture Details):No lab results found.  Most Recent TSH, T4 and A1c Labs:No lab results found.  Results for orders placed or performed during the hospital encounter of 12/19/24   XR Abdomen Port 1 View    Narrative    EXAMINATION: XR ABDOMEN PORT 1 VIEW 12/19/2024 4:36 PM     COMPARISON: None.    HISTORY: NG tube confirmation    TECHNIQUE: Frontal view of the abdomen.    FINDINGS: NG tube with tip and sidehole in the stomach.  Cholecystectomy clips. TIPS in the right upper quadrant. No abnormally  dilated loops of bowel. No pneumatosis or portal venous gas. No  definite pneumoperitoneum. Lung bases are clear.      Impression    IMPRESSION: Enteric tube tip and sidehole project over the stomach.    I have personally reviewed the examination and initial interpretation  and I agree with the findings.    NAUN SALMERON MD         SYSTEM ID:  [id number]   CT Abdomen Pelvis w Contrast    Narrative    EXAM: Abdomen/pelvis CT without contrast 12/23/2024 3:15 PM    HISTORY: 74 years Male s/p inguinal hernia repair and colon enterotomy  repair on 12/19/24. Evaluate for leak in colon or abscess. IV contrast  and rectal contrast (preferably gastrografin).    COMPARISON: None.    TECHNIQUE:  Axial CT images from the lung bases through the symphysis  pubis were obtained with intravenous and oral contrast. Coronal and  sagittal reformats provided.    CONTRAST: 82 mL Ispvue-370, 50 mL Omnipaque    FINDINGS:  Limited evaluation secondary to lack of intravenous contrast.     image(s) are noncontributory.    LINES/TUBES: Rectal tube. Left lower quadrant abdominal drain  terminating within the left inguinal canal anterior to the peritoneal  cavity.    HEPATIC: TIPS stent. No suspicious hepatic mass.     BILIARY: Status post cholecystectomy. Mild biliary ductal dilation  measuring up to 8 mm.    SPLEEN: Bladder megaly with craniocaudal dimension of 16 cm. No  abnormal mass.    PANCREAS: No pancreatic mass or peripancreatic fluid collection. No  pancreatic ductal dilation.    ADRENALS: Unremarkable.    RENAL: Symmetric enhancement of the renal cortical parenchyma. No  obstructive nephrolithiasis or hydronephrosis of either kidney. No  renal mass..    URINARY BLADDER: Moderately dilated. No abnormal wall thickening or  intraluminal mass.    REPRODUCTIVE: Unremarkable.    GASTROINTESTINAL: Postoperative changes of left inguinal hernia and  repair of the sigmoid colon enterotomy. Fat stranding in the left  hemipelvis involving the sigmoid and distal colon with small volume  adjacent ascites. Intraluminal contrast is demonstrated throughout the  large bowel without evidence of extravasation. An at least partially  loculated collection is seen anterior to the sigmoid colon measuring  1.5 cm on series 9 image 312 end series 12 image 61 Thickened  appearance of the sigmoid colon. Colonic diverticulosis. No abnormally  dilated loops of small or large bowel. The stomach and esophagus are  within normal limits.    MESENTERY/PERITONEUM: Fat stranding within the pelvis. Mild  abdominopelvic ascites. No pneumoperitoneum..    LYMPH NODES: No mesenteric lymphadenopathy.     VASCULAR: Moderate atherosclerotic  calcifications of the abdominal  vasculature. No evidence of focal occlusion. No aneurysmal dilation of  the abdominal aorta.    LUNG BASES: Bibasilar atelectasis. No focal pulmonary opacity or  pneumothorax..    MUSCULOSKELETAL: Degenerative changes in keeping with the patient's  age. No acute osseous abnormality. No aggressive osseous lesions.     SOFT TISSUES: Postoperative changes of left inguinal hernia repair.  Fluid and fat stranding involving the left inguinal canal. Right  inguinal fat-containing hernia. Mild subcutaneous emphysema involving  the ventral abdomen. Closure staples over the left hemiabdomen.      Impression    IMPRESSION:   1.  Postoperative changes of left hernia and sigmoid enterotomy  repair. There is sigmoid colon wall thickening with surrounding  inflammation and a partially loculated small fluid collection. This is  likely postprocedural inflammation. While developing infection may be  present, no well-defined drainable abscess is currently seen.  2.  No contrast extravasation or free air to indicate sigmoid  perforation following enterotomy repair.   3.  Sequela of portal hypertension. TIPS stent is in place.    I have personally reviewed the examination and initial interpretation  and I agree with the findings.    SAGAR ACEVES MD         SYSTEM ID:  [id number]

## 2024-12-24 NOTE — PROVIDER NOTIFICATION
Aditya STUART - Pt refusing TPN and lipids overnight. Needs new IV but refusing placement for new one. Says he has been eating well and doesn't feel he needs it

## 2024-12-24 NOTE — PLAN OF CARE
Temp: 97.5  F (36.4  C) Temp src: Oral BP: 118/63 Pulse: 77   Resp: 16 SpO2: 97 % O2 Device: None (Room air)       Goal Outcome Evaluation: Adequate for Discharge       Plan of Care Reviewed With: patient    Overall Patient Progress: improvingOverall Patient Progress: improving    Outcome Evaluation: Pt alert and oriented x4. R/A. VSS. Walking the halls. Good appetite. Remains on a full liquid diet at discharge. Voiding well. IV removed. Incision intact. BM yesterday. Plan for wife to pick him up at 3:15. will be discharged via w/c with [facility] transport. Pt aware to  discharge medications at the [facility] pharmacy. No further concerns at this time. will continue to monitor closely.

## 2024-12-26 LAB
BACTERIA ABSC ANAEROBE+AEROBE CULT: ABNORMAL
BACTERIA ABSC ANAEROBE+AEROBE CULT: NORMAL

## 2024-12-26 NOTE — PLAN OF CARE
Physical Therapy Discharge Summary    Reason for therapy discharge:    Discharged to home.    Progress towards therapy goal(s). See goals on Care Plan in [facility] electronic health record for goal details.  Goals partially met.  Barriers to achieving goals:   discharge from facility.    Therapy recommendation(s):    Continued therapy is recommended.  Rationale/Recommendations:  Pt mobilizing well in room IND, may benefit from ongoing OP PT following discharge to maximize functional recovery however pt endorsing inability/concern of pay..

## 2025-01-02 LAB — BACTERIA ABSC ANAEROBE+AEROBE CULT: NORMAL

## 2025-01-09 LAB — BACTERIA ABSC ANAEROBE+AEROBE CULT: NORMAL

## 2025-01-13 ENCOUNTER — OFFICE VISIT (OUTPATIENT)
Dept: TRANSPLANT | Facility: CLINIC | Age: 75
End: 2025-01-13
Attending: TRANSPLANT SURGERY
Payer: COMMERCIAL

## 2025-01-13 VITALS
TEMPERATURE: 97.4 F | HEART RATE: 89 BPM | RESPIRATION RATE: 16 BRPM | SYSTOLIC BLOOD PRESSURE: 129 MMHG | OXYGEN SATURATION: 95 % | DIASTOLIC BLOOD PRESSURE: 78 MMHG | WEIGHT: 141.1 LBS | BODY MASS INDEX: 22.1 KG/M2

## 2025-01-13 DIAGNOSIS — G89.18 ACUTE POST-OPERATIVE PAIN: Primary | ICD-10-CM

## 2025-01-13 PROCEDURE — 99024 POSTOP FOLLOW-UP VISIT: CPT | Performed by: TRANSPLANT SURGERY

## 2025-01-13 PROCEDURE — G0463 HOSPITAL OUTPT CLINIC VISIT: HCPCS | Performed by: TRANSPLANT SURGERY

## 2025-01-13 NOTE — PROGRESS NOTES
[facility], [place]   Transplant Surgery Daily Note          Assessment and Plan:     Hernia wound healed well. Would recommend avoiding constipation  Has upper GI discomfort, suspect peptic ulcer disease would recommend upper GI endoscopy and to reach out Fracisco Rodriguez PAC          Interval History:       Post inguinal hernia repair  ROS: 10 point ROS neg other than the symptoms noted above         Physical Exam:     /78 (BP Location: Right arm, Patient Position: Chair, Cuff Size: Adult Regular)   Pulse 89   Temp 97.4  F (36.3  C) (Oral)   Resp 16   Wt 64 kg (141 lb 1.6 oz)   SpO2 95%   BMI 22.10 kg/m     Constitutional:   HEENT: PERRL, no icterus  Hematologic / Lymphatic:  Lungs: Clear to auscultation bilaterally  Cardiovascular: RRR, s1, s2  Abdomen: hernia wound looks healthy        .

## 2025-01-13 NOTE — LETTER
1/13/2025      Les Morel  [id number] [address]      Dear Colleague,    Thank you for referring your patient, Les Morel, to the [facility]. Please see a copy of my visit note below.    [facility], [place]   Transplant Surgery Daily Note          Assessment and Plan:     Hernia wound healed well. Would recommend avoiding constipation  Has upper GI discomfort, suspect peptic ulcer disease would recommend upper GI endoscopy and to reach out Fracisco Rodriguez PAC          Interval History:       Post inguinal hernia repair  ROS: 10 point ROS neg other than the symptoms noted above         Physical Exam:     /78 (BP Location: Right arm, Patient Position: Chair, Cuff Size: Adult Regular)   Pulse 89   Temp 97.4  F (36.3  C) (Oral)   Resp 16   Wt 64 kg (141 lb 1.6 oz)   SpO2 95%   BMI 22.10 kg/m     Constitutional:   HEENT: PERRL, no icterus  Hematologic / Lymphatic:  Lungs: Clear to auscultation bilaterally  Cardiovascular: RRR, s1, s2  Abdomen: hernia wound looks healthy        .           Again, thank you for allowing me to participate in the care of your patient.        Sincerely,        Russ Dominguez MD    Electronically signed

## 2025-01-13 NOTE — NURSING NOTE
"Chief Complaint   Patient presents with    Follow Up     Hernia follow-up       Vital signs:  Temp: 97.4  F (36.3  C) Temp src: Oral BP: 129/78 Pulse: 89   Resp: 16 SpO2: 95 %       Weight: 64 kg (141 lb 1.6 oz)  Estimated body mass index is 22.1 kg/m  as calculated from the following:    Height as of 12/19/24: 1.702 m (5' 7\").    Weight as of this encounter: 64 kg (141 lb 1.6 oz).      Juan F Nair RN on 1/13/2025 at 9:31 AM    "

## 2025-01-16 LAB — BACTERIA ABSC ANAEROBE+AEROBE CULT: NO GROWTH

## 2025-06-07 ENCOUNTER — HEALTH MAINTENANCE LETTER (OUTPATIENT)
Age: 75
End: 2025-06-07

## (undated) DEVICE — SU PROLENE 2-0 MHDA 36" 8843H

## (undated) DEVICE — SU SILK 1 TIE 6X30" A307H

## (undated) DEVICE — STPL RELOAD LINEAR CUT 55MM TCR55

## (undated) DEVICE — Device

## (undated) DEVICE — SOL WATER IRRIG 1000ML BOTTLE 2F7114

## (undated) DEVICE — DRAPE IOBAN INCISE 23X17" 6650EZ

## (undated) DEVICE — SU PDS II 4-0 SH 27" Z315H

## (undated) DEVICE — SU VICRYL 0 CT-1 CR 8X27" UND JJ41G

## (undated) DEVICE — CLEANSER JET LAVAGE IRRISEPT 0.05% CHG IRRISEPT45USA

## (undated) DEVICE — SU VICRYL+ 3-0 27IN SH UND VCP416H

## (undated) DEVICE — DRAIN PENROSE 1/4"X18" LATEX  30416-025

## (undated) DEVICE — SU SILK 3-0 TIE 12X30" A304H

## (undated) DEVICE — SU SILK 4-0 TIE 12X30" A303H

## (undated) DEVICE — STPL LINEAR CUT 55MM TLC55

## (undated) DEVICE — SPECIMEN CONTAINER 5OZ STERILE 2600SA

## (undated) DEVICE — SPONGE LAP 18X18" 23250-400A

## (undated) DEVICE — SPONGE KITTNER 30-101

## (undated) DEVICE — SU MONOCRYL 4-0 PS-2 27" UND Y426H

## (undated) DEVICE — CATH TRAY FOLEY SURESTEP 16FR W/URNE MTR STLK LATEX A303316A

## (undated) DEVICE — DRAIN JACKSON PRATT CHANNEL 19FR ROUND HUBLESS SIL JP-2230

## (undated) DEVICE — NDL COUNTER 20CT 31142493

## (undated) DEVICE — SU PROLENE 4-0 RB-1DA 18" 8757H

## (undated) DEVICE — DRAIN JACKSON PRATT RESERVOIR 100ML SU130-1305

## (undated) DEVICE — APPLICATOR COTTON TIP 6"X2 STERILE LF 6012

## (undated) DEVICE — PREP CHLORAPREP 26ML TINTED HI-LITE ORANGE 930815

## (undated) DEVICE — SU PDS II 2-0 CT-1 27" Z339H

## (undated) DEVICE — SU SILK 0 TIE 6X30" A306H

## (undated) DEVICE — SU ETHILON 3-0 PS-1 18" 1663H

## (undated) DEVICE — SU VICRYL 3-0 SH 27" UND J416H

## (undated) DEVICE — LINEN TOWEL PACK X5 5464

## (undated) DEVICE — STPL SKIN 35W ROTATING HEAD PRW35

## (undated) DEVICE — TEST TUBE W/SCREW CAP 17361

## (undated) DEVICE — SUCTION TIP YANKAUER MEDI-VAC K82

## (undated) DEVICE — LINEN TOWEL PACK X6 WHITE 5487

## (undated) DEVICE — DRAPE SHEET REV FOLD 3/4 9349

## (undated) DEVICE — SU PROLENE 0 CT-1 30" 8424H

## (undated) DEVICE — DRAPE SLEEVE 599

## (undated) DEVICE — ESU GROUND PAD ADULT W/CORD E7507

## (undated) DEVICE — SU SILK 2-0 TIE 12X30" A305H

## (undated) RX ORDER — BUPIVACAINE HYDROCHLORIDE 2.5 MG/ML
INJECTION, SOLUTION EPIDURAL; INFILTRATION; INTRACAUDAL
Status: DISPENSED
Start: 2024-12-19

## (undated) RX ORDER — HYDROMORPHONE HYDROCHLORIDE 1 MG/ML
INJECTION, SOLUTION INTRAMUSCULAR; INTRAVENOUS; SUBCUTANEOUS
Status: DISPENSED
Start: 2024-12-19

## (undated) RX ORDER — FENTANYL CITRATE 50 UG/ML
INJECTION, SOLUTION INTRAMUSCULAR; INTRAVENOUS
Status: DISPENSED
Start: 2024-12-19